# Patient Record
Sex: FEMALE | Race: WHITE | Employment: OTHER | URBAN - METROPOLITAN AREA
[De-identification: names, ages, dates, MRNs, and addresses within clinical notes are randomized per-mention and may not be internally consistent; named-entity substitution may affect disease eponyms.]

---

## 2017-07-21 ENCOUNTER — HOSPITAL ENCOUNTER (OUTPATIENT)
Dept: LAB | Age: 67
Discharge: HOME | End: 2017-07-21
Payer: MEDICARE

## 2017-07-21 ENCOUNTER — OFFICE VISIT (OUTPATIENT)
Dept: INTERNAL MEDICINE CLINIC | Age: 67
End: 2017-07-21

## 2017-07-21 VITALS
RESPIRATION RATE: 15 BRPM | HEIGHT: 66 IN | OXYGEN SATURATION: 97 % | TEMPERATURE: 98.5 F | BODY MASS INDEX: 38.25 KG/M2 | SYSTOLIC BLOOD PRESSURE: 130 MMHG | DIASTOLIC BLOOD PRESSURE: 76 MMHG | WEIGHT: 238 LBS | HEART RATE: 93 BPM

## 2017-07-21 DIAGNOSIS — H04.123 DRY EYES, BILATERAL: ICD-10-CM

## 2017-07-21 DIAGNOSIS — F39 MOOD DISORDER (HCC): ICD-10-CM

## 2017-07-21 DIAGNOSIS — M79.10 MYALGIA: ICD-10-CM

## 2017-07-21 DIAGNOSIS — E78.2 MIXED HYPERLIPIDEMIA: ICD-10-CM

## 2017-07-21 DIAGNOSIS — K21.9 GASTROESOPHAGEAL REFLUX DISEASE WITHOUT ESOPHAGITIS: ICD-10-CM

## 2017-07-21 DIAGNOSIS — M19.90 ARTHRITIS: ICD-10-CM

## 2017-07-21 DIAGNOSIS — I10 ESSENTIAL HYPERTENSION: Primary | ICD-10-CM

## 2017-07-21 DIAGNOSIS — M43.26 FUSION OF LUMBAR SPINE: ICD-10-CM

## 2017-07-21 DIAGNOSIS — F41.8 DEPRESSION WITH ANXIETY: ICD-10-CM

## 2017-07-21 PROBLEM — Z98.1 S/P LUMBAR SPINAL FUSION: Status: ACTIVE | Noted: 2017-07-21

## 2017-07-21 PROCEDURE — 80164 ASSAY DIPROPYLACETIC ACD TOT: CPT

## 2017-07-21 PROCEDURE — 86803 HEPATITIS C AB TEST: CPT

## 2017-07-21 PROCEDURE — 82607 VITAMIN B-12: CPT

## 2017-07-21 PROCEDURE — 82550 ASSAY OF CK (CPK): CPT

## 2017-07-21 PROCEDURE — 85027 COMPLETE CBC AUTOMATED: CPT

## 2017-07-21 PROCEDURE — 84443 ASSAY THYROID STIM HORMONE: CPT

## 2017-07-21 PROCEDURE — 80053 COMPREHEN METABOLIC PANEL: CPT

## 2017-07-21 PROCEDURE — 36415 COLL VENOUS BLD VENIPUNCTURE: CPT

## 2017-07-21 RX ORDER — DIVALPROEX SODIUM 500 MG/1
500 TABLET, DELAYED RELEASE ORAL 3 TIMES DAILY
Qty: 60 TAB | Refills: 0 | Status: ON HOLD | COMMUNITY
Start: 2017-07-21 | End: 2019-10-30

## 2017-07-21 RX ORDER — SIMVASTATIN 20 MG/1
20 TABLET, FILM COATED ORAL
Qty: 30 TAB | Refills: 0 | Status: ON HOLD | COMMUNITY
Start: 2017-07-21 | End: 2019-10-30

## 2017-07-21 RX ORDER — BUSPIRONE HYDROCHLORIDE 15 MG/1
15 TABLET ORAL 2 TIMES DAILY
Qty: 60 TAB | Refills: 0 | Status: ON HOLD | COMMUNITY
Start: 2017-07-21 | End: 2019-10-30

## 2017-07-21 RX ORDER — METOPROLOL TARTRATE 25 MG/1
25 TABLET, FILM COATED ORAL 2 TIMES DAILY
Qty: 60 TAB | Refills: 0 | COMMUNITY
Start: 2017-07-21

## 2017-07-21 RX ORDER — CHLORTHALIDONE 25 MG/1
25 TABLET ORAL DAILY
Qty: 30 TAB | Refills: 0 | Status: ON HOLD | COMMUNITY
Start: 2017-07-21 | End: 2019-10-30

## 2017-07-21 RX ORDER — PAROXETINE HYDROCHLORIDE 20 MG/1
20 TABLET, FILM COATED ORAL DAILY
Qty: 30 TAB | Refills: 0 | Status: ON HOLD | COMMUNITY
Start: 2017-07-21 | End: 2019-10-30

## 2017-07-21 RX ORDER — BUPROPION HYDROCHLORIDE 75 MG/1
75 TABLET ORAL 2 TIMES DAILY
Qty: 60 TAB | Refills: 0 | Status: ON HOLD | COMMUNITY
Start: 2017-07-21 | End: 2019-10-30

## 2017-07-21 RX ORDER — RANITIDINE 150 MG/1
150 TABLET, FILM COATED ORAL 2 TIMES DAILY
Qty: 60 TAB | Refills: 0 | COMMUNITY
Start: 2017-07-21 | End: 2018-04-27

## 2017-07-21 NOTE — PROGRESS NOTES
Chief Complaint   Patient presents with    New Patient       1. Have you been to the ER, urgent care clinic since your last visit? Hospitalized since your last visit? No    2. Have you seen or consulted any other health care providers outside of the [address] [place] [address] since your last visit? Include any pap smears or colon screening. No    Body mass index is 38.41 kg/(m^2).

## 2017-07-21 NOTE — MR AVS SNAPSHOT
Visit Information Date & Time Provider Department Dept. Phone Encounter #  
 7/21/2017  2:00 PM Mayda Kaplan MD [facility] Internal Medicine [phone] [id number] Upcoming Health Maintenance Date Due DTaP/Tdap/Td series (1 - Tdap) 1/18/1969 BREAST CANCER SCRN MAMMOGRAM 1/18/1998 FOBT Q 1 YEAR AGE 50-75 1/18/1998 ZOSTER VACCINE AGE 60> 11/18/2007 GLAUCOMA SCREENING Q2Y 1/18/2013 OSTEOPOROSIS SCREENING (DEXA) 1/18/2013 Pneumococcal 65+ Low/Medium Risk (1 of 2 - PCV13) 1/18/2013 MEDICARE YEARLY EXAM 1/18/2013 INFLUENZA AGE 9 TO ADULT 8/1/2017 Allergies as of 7/21/2017  Review Complete On: 7/21/2017 By: Mayda Kaplan MD  
  
 Severity Noted Reaction Type Reactions Penicillins  07/21/2017    Rash Current Immunizations  Never Reviewed No immunizations on file. Not reviewed this visit You Were Diagnosed With   
  
 Codes Comments Essential hypertension    -  Primary ICD-10-CM: I10 
ICD-9-CM: 401.9 Mixed hyperlipidemia     ICD-10-CM: E78.2 ICD-9-CM: 272.2 Arthritis     ICD-10-CM: M19.90 ICD-9-CM: 716.90 Depression with anxiety     ICD-10-CM: F41.8 ICD-9-CM: 300.4 Fusion of lumbar spine     ICD-10-CM: M43.26 
ICD-9-CM: 724.9 Dry eyes, bilateral     ICD-10-CM: N63.289 ICD-9-CM: 375.15 Gastroesophageal reflux disease without esophagitis     ICD-10-CM: K21.9 ICD-9-CM: 530.81 Mood disorder ([facility] [address]     ICD-10-CM: F39 
ICD-9-CM: 296.90 Myalgia     ICD-10-CM: M79.1 ICD-9-CM: 729.1 Vitals BP Pulse Temp Resp Height(growth percentile) Weight(growth percentile) 130/76 (BP 1 Location: Left arm, BP Patient Position: Sitting) 93 98.5 °F (36.9 °C) (Oral) 15 5' 6\" (1.676 m) 238 lb (108 kg) SpO2 BMI OB Status Smoking Status 97% 38.41 kg/m2 Postmenopausal Never Smoker Vitals History BMI and BSA Data Body Mass Index Body Surface Area  
 38.41 kg/m 2 2.24 m 2 Preferred Pharmacy Pharmacy Name Phone [facility], [address] [place] [address] [phone] Your Updated Medication List  
  
   
This list is accurate as of: 7/21/17  3:28 PM.  Always use your most recent med list.  
  
  
  
  
 buPROPion 75 mg tablet Commonly known as:  [facility] Take 1 Tab by mouth two (2) times a day. busPIRone 15 mg tablet Commonly known as:  BUSPAR Take 1 Tab by mouth two (2) times a day. chlorthalidone 25 mg tablet Commonly known as:  Debby Mooring Take 1 Tab by mouth daily. divalproex  mg tablet Commonly known as:  DEPAKOTE Take 1 Tab by mouth two (2) times a day. metoprolol tartrate 25 mg tablet Commonly known as:  LOPRESSOR Take 1 Tab by mouth two (2) times a day. PARoxetine 20 mg tablet Commonly known as:  PAXIL Take 1 Tab by mouth daily. raNITIdine 150 mg tablet Commonly known as:  ZANTAC Take 1 Tab by mouth two (2) times a day. simvastatin 20 mg tablet Commonly known as:  ZOCOR Take 1 Tab by mouth nightly. We Performed the Following CBC W/O DIFF [82021 CPT(R)] CK [id number] CPT(R)] HEPATITIS C AB [28185 CPT(R)] METABOLIC PANEL, COMPREHENSIVE [59790 CPT(R)] TSH 3RD GENERATION [52673 CPT(R)] VALPROIC ACID [70671 CPT(R)] VITAMIN B12 [id number] CPT(R)] Introducing [facility] SERVICES! [facility] introduces [employer] patient portal. Now you can access parts of your medical record, email your doctor's office, and request medication refills online. 1. In your internet browser, go to [email or web address] 2. Click on the First Time User? Click Here link in the Sign In box. You will see the New Member Sign Up page. 3. Enter your [employer] Access Code exactly as it appears below. You will not need to use this code after youve completed the sign-up process.  If you do not sign up before the expiration date, you must request a new code. · [employer] Access Code: [email or web address] Expires: 10/19/2017  3:27 PM 
 
4. Enter the last four digits of your Social Security Number (xxxx) and Date of Birth (mm/dd/yyyy) as indicated and click Submit. You will be taken to the next sign-up page. 5. Create a [employer]APR ID. This will be your [employer] login ID and cannot be changed, so think of one that is secure and easy to remember. 6. Create a [employer] password. You can change your password at any time. 7. Enter your Password Reset Question and Answer. This can be used at a later time if you forget your password. 8. Enter your e-mail address. You will receive e-mail notification when new information is available in [address] 9. Click Sign Up. You can now view and download portions of your medical record. 10. Click the Download Summary menu link to download a portable copy of your medical information. If you have questions, please visit the Frequently Asked Questions section of the [employer] website. Remember, [employer] is NOT to be used for urgent needs. For medical emergencies, dial 911. Now available from your iPhone and Android! Please provide this summary of care documentation to your next provider. Your primary care clinician is listed as Leonora Gilbert. If you have any questions after today's visit, please call [phone].

## 2017-07-21 NOTE — PROGRESS NOTES
Written by Boston Yoder, as dictated by Dr. Ilana Cortes MD.    Johnny Oshea is a 69 y.o. female. HPI  The patient comes in today to establish care. She recently moved from [place], [place]. She has hx of CKD stage III, diagnosed 4 years ago. It has remained stable since then. She has taken ibuprofen and Prilosec before, but no longer takes Prilosec. She does take Zantac for acid reflux. She does still take ibuprofen for lower back pain as Tylenol does not help. She has past surgical hx of lumbar fusion, which did not help completely with the back pain but has improved it. She was advised by PT to lay on her stomach to help flatten her back, and she finds that it helps. She does not have sciatica. She also had a R hip replacement, which has helped significantly with her hip pain. She has recently been experiencing arm muscle pain and stiffness in the morning. She has a hx of hypertension which has been controlled with medication (metoprolol and chlorthalidone) for at least 10 years. She denies hx of heart issues. She has also been experiencing on-and-off superficial pain in her RLQ, which has not been hurting for the last couple weeks. She follows with ophthalmology for chronic dry eyes. She does take eyedrops for this and tries to keep up with blinking exercises. She is also compliant on medication for high cholesterol. She has a hx of depression/anxiety after a difficult pregnancy. She is compliant on Wellbutrin, Paxil, Depakote, and Buspar. She did admit  not drinking  enough water. She has a hx of vulvar dystrophy and used to take medication and follow with an ob/gyn for it, but does not anymore. She would like to lose weight.     Patient Active Problem List   Diagnosis Code    Essential hypertension I10    Mixed hyperlipidemia E78.2    Arthritis M19.90    Fusion of lumbar spine M43.26    Dry eyes, bilateral H04.123    Gastroesophageal reflux disease without esophagitis K21.9        No current outpatient prescriptions on file prior to visit. No current facility-administered medications on file prior to visit. Allergies   Allergen Reactions    Penicillins Rash       Past Medical History:   Diagnosis Date    Chronic kidney disease     Depression     Hypercholesterolemia     Hypertension        Past Surgical History:   Procedure Laterality Date    HX APPENDECTOMY      HX CHOLECYSTECTOMY      HX LUMBAR FUSION      HX ORTHOPAEDIC      HX TONSIL AND ADENOIDECTOMY         Family History   Problem Relation Age of Onset    No Known Problems Mother     Heart Attack Father     Heart Disease Maternal Aunt     Heart Attack Paternal Uncle     Cancer Maternal Aunt     Heart Disease Paternal Aunt        Social History     Social History    Marital status: UNKNOWN     Spouse name: N/A    Number of children: N/A    Years of education: N/A     Occupational History    Not on file. Social History Main Topics    Smoking status: Never Smoker    Smokeless tobacco: Never Used    Alcohol use No      Comment: rarely    Drug use: No    Sexual activity: Not Currently     Other Topics Concern    Not on file     Social History Narrative    No narrative on file         Review of Systems   Constitutional: Negative for malaise/fatigue. HENT: Negative for congestion. Eyes: Negative for blurred vision and pain. Respiratory: Negative for cough and shortness of breath. Cardiovascular: Negative for chest pain and palpitations. Gastrointestinal: Positive for heartburn. Negative for abdominal pain. Musculoskeletal: Positive for back pain. Negative for joint pain and myalgias. Neurological: Negative for weakness and headaches. Psychiatric/Behavioral: Positive for depression. Negative for memory loss and substance abuse. The patient is nervous/anxious.       Visit Vitals    /76 (BP 1 Location: Left arm, BP Patient Position: Sitting)    Pulse 93    Temp 98.5 °F (36.9 °C) (Oral)    Resp 15    Ht 5' 6\" (1.676 m)    Wt 238 lb (108 kg)    SpO2 97%    BMI 38.41 kg/m2       Physical Exam   Constitutional: She is oriented to person, place, and time. She appears well-developed. No distress. Obese   HENT:   Right Ear: External ear normal.   Left Ear: External ear normal.   Eyes: Conjunctivae and EOM are normal. Right eye exhibits no discharge. Left eye exhibits no discharge. Neck: Normal range of motion. Neck supple. Cardiovascular: Normal rate and regular rhythm. Pulses:       Dorsalis pedis pulses are 2+ on the right side, and 2+ on the left side. Pulmonary/Chest: Effort normal and breath sounds normal. She has no wheezes. Abdominal: Soft. Bowel sounds are normal. There is no tenderness. Lymphadenopathy:     She has no cervical adenopathy. Neurological: She is alert and oriented to person, place, and time. Skin: She is not diaphoretic. Psychiatric: She has a normal mood and affect. Her behavior is normal.   Nursing note and vitals reviewed. ASSESSMENT and PLAN    ICD-10-CM ICD-9-CM    1. Essential hypertension I10 401.9 metoprolol tartrate (LOPRESSOR) 25 mg tablet sent to pharmacy      chlorthalidone (HYGROTEN) 25 mg tablet sent to pharmacy      CBC W/O DIFF      METABOLIC PANEL, COMPREHENSIVE    Metoprolol and chlorthalidone refilled. Will check labs today. She should be drinking at least 6 glasses of water per day     2. Mixed hyperlipidemia E78.2 272.2 simvastatin (ZOCOR) 20 mg tablet sent to pharmacy    Zocor refilled. 3. Arthritis M19.90 716.90 Pt takes ibuprofen prn for pain. 4. Depression with anxiety F41.8 300.4 PARoxetine (PAXIL) 20 mg tablet sent to pharmacy      buPROPion (WELLBUTRIN) 75 mg tablet sent to pharmacy      busPIRone (BUSPAR) 15 mg tablet sent to pharmacy      VALPROIC ACID    Paxil, Wellbutrin, and Buspar refilled. 5. Fusion of lumbar spine M43.26 724.9 Pt takes ibuprofen for back pain   6.  Dry eyes, bilateral H04. 123 375.15 Pt follows with ophthalmology. 7. Gastroesophageal reflux disease without esophagitis K21.9 530.81 raNITIdine (ZANTAC) 150 mg tablet sent to pharmacy    Zantac refilled. 8. [facility] F39 296.90 divalproex DR (DEPAKOTE) 500 mg tablet sent to pharmacy    Depakote refilled. 9. Myalgia M79.1 729.1 Check CK. TSH 3RD GENERATION      VITAMIN B12      HEPATITIS C AB    Will recheck her levels today. This plan was reviewed with the patient and patient agrees. All questions were answered. This scribe documentation was reviewed by me and accurately reflects the examination and decisions made by me. This note will not be viewable in [address]

## 2017-07-22 LAB
ALBUMIN SERPL-MCNC: 4.4 G/DL (ref 3.6–4.8)
ALBUMIN/GLOB SERPL: 1.7 {RATIO} (ref 1.2–2.2)
ALP SERPL-CCNC: 116 IU/L (ref 39–117)
ALT SERPL-CCNC: 17 IU/L (ref 0–32)
AST SERPL-CCNC: 19 IU/L (ref 0–40)
BILIRUB SERPL-MCNC: 0.7 MG/DL (ref 0–1.2)
BUN SERPL-MCNC: 17 MG/DL (ref 8–27)
BUN/CREAT SERPL: 11 (ref 12–28)
CALCIUM SERPL-MCNC: 9.4 MG/DL (ref 8.7–10.3)
CHLORIDE SERPL-SCNC: 98 MMOL/L (ref 96–106)
CK SERPL-CCNC: 140 U/L (ref 24–173)
CO2 SERPL-SCNC: 24 MMOL/L (ref 18–29)
CREAT SERPL-MCNC: 1.48 MG/DL (ref 0.57–1)
ERYTHROCYTE [DISTWIDTH] IN BLOOD BY AUTOMATED COUNT: 15.1 % (ref 12.3–15.4)
GLOBULIN SER CALC-MCNC: 2.6 G/DL (ref 1.5–4.5)
GLUCOSE SERPL-MCNC: 92 MG/DL (ref 65–99)
HCT VFR BLD AUTO: 44.1 % (ref 34–46.6)
HCV AB S/CO SERPL IA: <0.1 S/CO RATIO (ref 0–0.9)
HGB BLD-MCNC: 14.6 G/DL (ref 11.1–15.9)
INTERPRETATION: NORMAL
MCH RBC QN AUTO: 29.1 PG (ref 26.6–33)
MCHC RBC AUTO-ENTMCNC: 33.1 G/DL (ref 31.5–35.7)
MCV RBC AUTO: 88 FL (ref 79–97)
PLATELET # BLD AUTO: 236 X10E3/UL (ref 150–379)
POTASSIUM SERPL-SCNC: 4.3 MMOL/L (ref 3.5–5.2)
PROT SERPL-MCNC: 7 G/DL (ref 6–8.5)
RBC # BLD AUTO: 5.01 X10E6/UL (ref 3.77–5.28)
SODIUM SERPL-SCNC: 141 MMOL/L (ref 134–144)
TSH SERPL DL<=0.005 MIU/L-ACNC: 2.09 UIU/ML (ref 0.45–4.5)
VALPROATE SERPL-MCNC: 39 UG/ML (ref 50–100)
VIT B12 SERPL-MCNC: 559 PG/ML (ref 211–946)
WBC # BLD AUTO: 6.2 X10E3/UL (ref 3.4–10.8)

## 2017-07-22 NOTE — PROGRESS NOTES
Let her know creatinine is elevated. Has atleast stage 2 disease. Will discuss rest of the labs during her visit.

## 2017-08-07 ENCOUNTER — OFFICE VISIT (OUTPATIENT)
Dept: INTERNAL MEDICINE CLINIC | Age: 67
End: 2017-08-07

## 2017-08-23 DIAGNOSIS — M19.90 ARTHRITIS: ICD-10-CM

## 2017-08-23 DIAGNOSIS — I10 ESSENTIAL HYPERTENSION: Primary | ICD-10-CM

## 2017-08-23 DIAGNOSIS — E78.2 MIXED HYPERLIPIDEMIA: ICD-10-CM

## 2017-12-01 ENCOUNTER — HOSPITAL ENCOUNTER (OUTPATIENT)
Dept: CT IMAGING | Age: 67
Discharge: HOME | End: 2017-12-01
Attending: PHYSICIAN ASSISTANT
Payer: MEDICARE

## 2017-12-01 DIAGNOSIS — Z98.1 S/P LUMBAR FUSION: ICD-10-CM

## 2017-12-01 DIAGNOSIS — M48.061 LUMBAR STENOSIS: ICD-10-CM

## 2017-12-01 DIAGNOSIS — M54.50 LOW BACK PAIN: ICD-10-CM

## 2017-12-01 DIAGNOSIS — R29.2 ABNORMAL REFLEXES: ICD-10-CM

## 2017-12-01 PROCEDURE — 72131 CT LUMBAR SPINE W/O DYE: CPT

## 2017-12-05 ENCOUNTER — HOSPITAL ENCOUNTER (OUTPATIENT)
Dept: MRI IMAGING | Age: 67
Discharge: HOME | End: 2017-12-05
Attending: PHYSICIAN ASSISTANT
Payer: MEDICARE

## 2017-12-05 DIAGNOSIS — R29.2 ABNORMAL REFLEXES: ICD-10-CM

## 2017-12-05 DIAGNOSIS — M54.50 LOW BACK PAIN: ICD-10-CM

## 2017-12-05 DIAGNOSIS — M48.061 LUMBAR STENOSIS: ICD-10-CM

## 2017-12-05 DIAGNOSIS — Z98.1 S/P LUMBAR FUSION: ICD-10-CM

## 2017-12-05 PROCEDURE — 72148 MRI LUMBAR SPINE W/O DYE: CPT

## 2017-12-05 PROCEDURE — 72141 MRI NECK SPINE W/O DYE: CPT

## 2017-12-05 PROCEDURE — 72146 MRI CHEST SPINE W/O DYE: CPT

## 2018-01-16 ENCOUNTER — APPOINTMENT (OUTPATIENT)
Dept: GENERAL RADIOLOGY | Age: 68
End: 2018-01-16
Attending: ORTHOPAEDIC SURGERY
Payer: MEDICARE

## 2018-01-16 ENCOUNTER — HOSPITAL ENCOUNTER (OUTPATIENT)
Age: 68
Setting detail: OBSERVATION
Discharge: HOME | End: 2018-01-17
Attending: ORTHOPAEDIC SURGERY | Admitting: ORTHOPAEDIC SURGERY
Payer: MEDICARE

## 2018-01-16 ENCOUNTER — ANESTHESIA EVENT (OUTPATIENT)
Dept: SURGERY | Age: 68
End: 2018-01-16
Payer: MEDICARE

## 2018-01-16 ENCOUNTER — ANESTHESIA (OUTPATIENT)
Dept: SURGERY | Age: 68
End: 2018-01-16
Payer: MEDICARE

## 2018-01-16 DIAGNOSIS — M48.02 CERVICAL STENOSIS OF SPINAL CANAL: Primary | ICD-10-CM

## 2018-01-16 PROCEDURE — 76010000172 HC OR TIME 2.5 TO 3 HR INTENSV-TIER 1: Performed by: ORTHOPAEDIC SURGERY

## 2018-01-16 PROCEDURE — 74011250637 HC RX REV CODE- 250/637: Performed by: PHYSICIAN ASSISTANT

## 2018-01-16 PROCEDURE — 77030018846 HC SOL IRR STRL H20 ICUM -A: Performed by: ORTHOPAEDIC SURGERY

## 2018-01-16 PROCEDURE — 77030032490 HC SLV COMPR SCD KNE COVD -B: Performed by: ORTHOPAEDIC SURGERY

## 2018-01-16 PROCEDURE — 99218 HC RM OBSERVATION: CPT

## 2018-01-16 PROCEDURE — 77030012464: Performed by: ORTHOPAEDIC SURGERY

## 2018-01-16 PROCEDURE — 77030029099 HC BN WAX SSPC -A: Performed by: ORTHOPAEDIC SURGERY

## 2018-01-16 PROCEDURE — 74011250636 HC RX REV CODE- 250/636

## 2018-01-16 PROCEDURE — C1713 ANCHOR/SCREW BN/BN,TIS/BN: HCPCS | Performed by: ORTHOPAEDIC SURGERY

## 2018-01-16 PROCEDURE — 77030026438 HC STYL ET INTUB CARD -A: Performed by: ANESTHESIOLOGY

## 2018-01-16 PROCEDURE — 77030012406 HC DRN WND PENRS BARD -A: Performed by: ORTHOPAEDIC SURGERY

## 2018-01-16 PROCEDURE — 74011000250 HC RX REV CODE- 250

## 2018-01-16 PROCEDURE — 74011250636 HC RX REV CODE- 250/636: Performed by: PHYSICIAN ASSISTANT

## 2018-01-16 PROCEDURE — 77030031139 HC SUT VCRL2 J&J -A: Performed by: ORTHOPAEDIC SURGERY

## 2018-01-16 PROCEDURE — 76210000000 HC OR PH I REC 2 TO 2.5 HR: Performed by: ORTHOPAEDIC SURGERY

## 2018-01-16 PROCEDURE — 77030013079 HC BLNKT BAIR HGGR 3M -A: Performed by: ANESTHESIOLOGY

## 2018-01-16 PROCEDURE — 77030014007 HC SPNG HEMSTAT J&J -B: Performed by: ORTHOPAEDIC SURGERY

## 2018-01-16 PROCEDURE — 74011250636 HC RX REV CODE- 250/636: Performed by: ANESTHESIOLOGY

## 2018-01-16 PROCEDURE — 77030004391 HC BUR FLUT MEDT -C: Performed by: ORTHOPAEDIC SURGERY

## 2018-01-16 PROCEDURE — 77030011267 HC ELECTRD BLD COVD -A: Performed by: ORTHOPAEDIC SURGERY

## 2018-01-16 PROCEDURE — 77030022965 HC BIT DRL W/STP GLBM -B: Performed by: ORTHOPAEDIC SURGERY

## 2018-01-16 PROCEDURE — 77030013567 HC DRN WND RESERV BARD -A: Performed by: ORTHOPAEDIC SURGERY

## 2018-01-16 PROCEDURE — 74011000272 HC RX REV CODE- 272: Performed by: ORTHOPAEDIC SURGERY

## 2018-01-16 PROCEDURE — 76060000036 HC ANESTHESIA 2.5 TO 3 HR: Performed by: ORTHOPAEDIC SURGERY

## 2018-01-16 PROCEDURE — 74011000250 HC RX REV CODE- 250: Performed by: ORTHOPAEDIC SURGERY

## 2018-01-16 PROCEDURE — 77030003666 HC NDL SPINAL BD -A: Performed by: ORTHOPAEDIC SURGERY

## 2018-01-16 PROCEDURE — 72020 X-RAY EXAM OF SPINE 1 VIEW: CPT

## 2018-01-16 PROCEDURE — 77030018836 HC SOL IRR NACL ICUM -A: Performed by: ORTHOPAEDIC SURGERY

## 2018-01-16 PROCEDURE — 74011250636 HC RX REV CODE- 250/636: Performed by: ORTHOPAEDIC SURGERY

## 2018-01-16 PROCEDURE — 77030038600 HC TU BPLR IRR DISP STRY -B: Performed by: ORTHOPAEDIC SURGERY

## 2018-01-16 PROCEDURE — 77030008684 HC TU ET CUF COVD -B: Performed by: ANESTHESIOLOGY

## 2018-01-16 DEVICE — BONE SPCR CERV LORDC 7X9MM --: Type: IMPLANTABLE DEVICE | Site: SPINE CERVICAL | Status: FUNCTIONAL

## 2018-01-16 DEVICE — ASSURE CERVICAL PLATE 2-LEVEL, 36MM
Type: IMPLANTABLE DEVICE | Site: SPINE CERVICAL | Status: FUNCTIONAL
Brand: ASSURE

## 2018-01-16 DEVICE — BONE SPCR CERV LORDC 5X7MM --: Type: IMPLANTABLE DEVICE | Site: SPINE CERVICAL | Status: FUNCTIONAL

## 2018-01-16 DEVICE — 4.0MM RIGID SCREW, SELF-TAPPING, 12MM
Type: IMPLANTABLE DEVICE | Site: SPINE CERVICAL | Status: FUNCTIONAL
Brand: ASSURE

## 2018-01-16 DEVICE — SET SCREW FOR RIGID SCREWS
Type: IMPLANTABLE DEVICE | Site: SPINE CERVICAL | Status: FUNCTIONAL
Brand: ASSURE

## 2018-01-16 RX ORDER — SODIUM CHLORIDE 9 MG/ML
1000 INJECTION, SOLUTION INTRAVENOUS CONTINUOUS
Status: DISCONTINUED | OUTPATIENT
Start: 2018-01-16 | End: 2018-01-16 | Stop reason: HOSPADM

## 2018-01-16 RX ORDER — FAMOTIDINE 20 MG/1
20 TABLET, FILM COATED ORAL
Status: DISCONTINUED | OUTPATIENT
Start: 2018-01-16 | End: 2018-01-17 | Stop reason: HOSPADM

## 2018-01-16 RX ORDER — BUPROPION HYDROCHLORIDE 75 MG/1
75 TABLET ORAL 2 TIMES DAILY
Status: DISCONTINUED | OUTPATIENT
Start: 2018-01-16 | End: 2018-01-17 | Stop reason: HOSPADM

## 2018-01-16 RX ORDER — SODIUM CHLORIDE 0.9 % (FLUSH) 0.9 %
5-10 SYRINGE (ML) INJECTION EVERY 8 HOURS
Status: DISCONTINUED | OUTPATIENT
Start: 2018-01-16 | End: 2018-01-17 | Stop reason: HOSPADM

## 2018-01-16 RX ORDER — DIAZEPAM 5 MG/1
5 TABLET ORAL
Status: DISCONTINUED | OUTPATIENT
Start: 2018-01-16 | End: 2018-01-17 | Stop reason: HOSPADM

## 2018-01-16 RX ORDER — ACETAMINOPHEN 325 MG/1
650 TABLET ORAL
Status: DISCONTINUED | OUTPATIENT
Start: 2018-01-16 | End: 2018-01-17 | Stop reason: HOSPADM

## 2018-01-16 RX ORDER — NALOXONE HYDROCHLORIDE 0.4 MG/ML
0.04 INJECTION, SOLUTION INTRAMUSCULAR; INTRAVENOUS; SUBCUTANEOUS AS NEEDED
Status: DISCONTINUED | OUTPATIENT
Start: 2018-01-16 | End: 2018-01-16 | Stop reason: HOSPADM

## 2018-01-16 RX ORDER — SODIUM CHLORIDE 0.9 % (FLUSH) 0.9 %
5-10 SYRINGE (ML) INJECTION AS NEEDED
Status: DISCONTINUED | OUTPATIENT
Start: 2018-01-16 | End: 2018-01-16 | Stop reason: HOSPADM

## 2018-01-16 RX ORDER — SODIUM CHLORIDE 0.9 % (FLUSH) 0.9 %
5-10 SYRINGE (ML) INJECTION AS NEEDED
Status: DISCONTINUED | OUTPATIENT
Start: 2018-01-16 | End: 2018-01-17 | Stop reason: HOSPADM

## 2018-01-16 RX ORDER — LIDOCAINE HYDROCHLORIDE 10 MG/ML
0.1 INJECTION, SOLUTION EPIDURAL; INFILTRATION; INTRACAUDAL; PERINEURAL AS NEEDED
Status: DISCONTINUED | OUTPATIENT
Start: 2018-01-16 | End: 2018-01-16 | Stop reason: HOSPADM

## 2018-01-16 RX ORDER — FENTANYL CITRATE 50 UG/ML
25 INJECTION, SOLUTION INTRAMUSCULAR; INTRAVENOUS
Status: DISCONTINUED | OUTPATIENT
Start: 2018-01-16 | End: 2018-01-16 | Stop reason: HOSPADM

## 2018-01-16 RX ORDER — ONDANSETRON 2 MG/ML
4 INJECTION INTRAMUSCULAR; INTRAVENOUS
Status: DISCONTINUED | OUTPATIENT
Start: 2018-01-16 | End: 2018-01-17 | Stop reason: HOSPADM

## 2018-01-16 RX ORDER — SODIUM CHLORIDE 9 MG/ML
125 INJECTION, SOLUTION INTRAVENOUS CONTINUOUS
Status: DISCONTINUED | OUTPATIENT
Start: 2018-01-16 | End: 2018-01-17 | Stop reason: HOSPADM

## 2018-01-16 RX ORDER — DIPHENHYDRAMINE HYDROCHLORIDE 50 MG/ML
12.5 INJECTION, SOLUTION INTRAMUSCULAR; INTRAVENOUS AS NEEDED
Status: DISCONTINUED | OUTPATIENT
Start: 2018-01-16 | End: 2018-01-16 | Stop reason: HOSPADM

## 2018-01-16 RX ORDER — FENTANYL CITRATE 50 UG/ML
INJECTION, SOLUTION INTRAMUSCULAR; INTRAVENOUS AS NEEDED
Status: DISCONTINUED | OUTPATIENT
Start: 2018-01-16 | End: 2018-01-16 | Stop reason: HOSPADM

## 2018-01-16 RX ORDER — MIDAZOLAM HYDROCHLORIDE 1 MG/ML
1 INJECTION, SOLUTION INTRAMUSCULAR; INTRAVENOUS AS NEEDED
Status: DISCONTINUED | OUTPATIENT
Start: 2018-01-16 | End: 2018-01-16 | Stop reason: HOSPADM

## 2018-01-16 RX ORDER — DIVALPROEX SODIUM 500 MG/1
500 TABLET, DELAYED RELEASE ORAL 2 TIMES DAILY
Status: DISCONTINUED | OUTPATIENT
Start: 2018-01-16 | End: 2018-01-17 | Stop reason: HOSPADM

## 2018-01-16 RX ORDER — NALOXONE HYDROCHLORIDE 0.4 MG/ML
0.4 INJECTION, SOLUTION INTRAMUSCULAR; INTRAVENOUS; SUBCUTANEOUS AS NEEDED
Status: DISCONTINUED | OUTPATIENT
Start: 2018-01-16 | End: 2018-01-17 | Stop reason: HOSPADM

## 2018-01-16 RX ORDER — SIMVASTATIN 20 MG/1
20 TABLET, FILM COATED ORAL
Status: DISCONTINUED | OUTPATIENT
Start: 2018-01-16 | End: 2018-01-17 | Stop reason: HOSPADM

## 2018-01-16 RX ORDER — HYDROMORPHONE HYDROCHLORIDE 1 MG/ML
0.5 INJECTION, SOLUTION INTRAMUSCULAR; INTRAVENOUS; SUBCUTANEOUS
Status: DISCONTINUED | OUTPATIENT
Start: 2018-01-16 | End: 2018-01-17 | Stop reason: HOSPADM

## 2018-01-16 RX ORDER — ROCURONIUM BROMIDE 10 MG/ML
INJECTION, SOLUTION INTRAVENOUS AS NEEDED
Status: DISCONTINUED | OUTPATIENT
Start: 2018-01-16 | End: 2018-01-16 | Stop reason: HOSPADM

## 2018-01-16 RX ORDER — PAROXETINE HYDROCHLORIDE 20 MG/1
20 TABLET, FILM COATED ORAL DAILY
Status: DISCONTINUED | OUTPATIENT
Start: 2018-01-17 | End: 2018-01-17 | Stop reason: HOSPADM

## 2018-01-16 RX ORDER — ONDANSETRON 2 MG/ML
INJECTION INTRAMUSCULAR; INTRAVENOUS AS NEEDED
Status: DISCONTINUED | OUTPATIENT
Start: 2018-01-16 | End: 2018-01-16 | Stop reason: HOSPADM

## 2018-01-16 RX ORDER — SODIUM CHLORIDE, SODIUM LACTATE, POTASSIUM CHLORIDE, CALCIUM CHLORIDE 600; 310; 30; 20 MG/100ML; MG/100ML; MG/100ML; MG/100ML
125 INJECTION, SOLUTION INTRAVENOUS CONTINUOUS
Status: DISCONTINUED | OUTPATIENT
Start: 2018-01-16 | End: 2018-01-16 | Stop reason: HOSPADM

## 2018-01-16 RX ORDER — METOPROLOL TARTRATE 25 MG/1
25 TABLET, FILM COATED ORAL 2 TIMES DAILY
Status: DISCONTINUED | OUTPATIENT
Start: 2018-01-16 | End: 2018-01-17 | Stop reason: HOSPADM

## 2018-01-16 RX ORDER — MORPHINE SULFATE 10 MG/ML
2 INJECTION, SOLUTION INTRAMUSCULAR; INTRAVENOUS
Status: DISCONTINUED | OUTPATIENT
Start: 2018-01-16 | End: 2018-01-16 | Stop reason: HOSPADM

## 2018-01-16 RX ORDER — SODIUM CHLORIDE 9 MG/ML
50 INJECTION, SOLUTION INTRAVENOUS CONTINUOUS
Status: DISCONTINUED | OUTPATIENT
Start: 2018-01-16 | End: 2018-01-16 | Stop reason: HOSPADM

## 2018-01-16 RX ORDER — OXYCODONE AND ACETAMINOPHEN 5; 325 MG/1; MG/1
1 TABLET ORAL AS NEEDED
Status: DISCONTINUED | OUTPATIENT
Start: 2018-01-16 | End: 2018-01-16 | Stop reason: HOSPADM

## 2018-01-16 RX ORDER — OXYCODONE HYDROCHLORIDE 5 MG/1
10 TABLET ORAL
Status: DISCONTINUED | OUTPATIENT
Start: 2018-01-16 | End: 2018-01-17 | Stop reason: HOSPADM

## 2018-01-16 RX ORDER — HYDROMORPHONE HCL IN 0.9% NACL 15 MG/30ML
PATIENT CONTROLLED ANALGESIA VIAL INTRAVENOUS
Status: DISCONTINUED | OUTPATIENT
Start: 2018-01-16 | End: 2018-01-17

## 2018-01-16 RX ORDER — FENTANYL CITRATE 50 UG/ML
50 INJECTION, SOLUTION INTRAMUSCULAR; INTRAVENOUS AS NEEDED
Status: DISCONTINUED | OUTPATIENT
Start: 2018-01-16 | End: 2018-01-16 | Stop reason: HOSPADM

## 2018-01-16 RX ORDER — PHENYLEPHRINE HCL IN 0.9% NACL 0.4MG/10ML
SYRINGE (ML) INTRAVENOUS AS NEEDED
Status: DISCONTINUED | OUTPATIENT
Start: 2018-01-16 | End: 2018-01-16 | Stop reason: HOSPADM

## 2018-01-16 RX ORDER — LORAZEPAM 1 MG/1
1 TABLET ORAL
Status: ON HOLD | COMMUNITY
End: 2019-10-30

## 2018-01-16 RX ORDER — LIDOCAINE HYDROCHLORIDE 20 MG/ML
INJECTION, SOLUTION EPIDURAL; INFILTRATION; INTRACAUDAL; PERINEURAL AS NEEDED
Status: DISCONTINUED | OUTPATIENT
Start: 2018-01-16 | End: 2018-01-16 | Stop reason: HOSPADM

## 2018-01-16 RX ORDER — AMOXICILLIN 250 MG
1 CAPSULE ORAL 2 TIMES DAILY
Status: DISCONTINUED | OUTPATIENT
Start: 2018-01-16 | End: 2018-01-17 | Stop reason: HOSPADM

## 2018-01-16 RX ORDER — FACIAL-BODY WIPES
10 EACH TOPICAL DAILY PRN
Status: DISCONTINUED | OUTPATIENT
Start: 2018-01-18 | End: 2018-01-17 | Stop reason: HOSPADM

## 2018-01-16 RX ORDER — POLYETHYLENE GLYCOL 3350 17 G/17G
17 POWDER, FOR SOLUTION ORAL DAILY
Status: DISCONTINUED | OUTPATIENT
Start: 2018-01-17 | End: 2018-01-17 | Stop reason: HOSPADM

## 2018-01-16 RX ORDER — DIPHENHYDRAMINE HYDROCHLORIDE 50 MG/ML
12.5 INJECTION, SOLUTION INTRAMUSCULAR; INTRAVENOUS
Status: DISCONTINUED | OUTPATIENT
Start: 2018-01-16 | End: 2018-01-17 | Stop reason: HOSPADM

## 2018-01-16 RX ORDER — MIDAZOLAM HYDROCHLORIDE 1 MG/ML
INJECTION, SOLUTION INTRAMUSCULAR; INTRAVENOUS AS NEEDED
Status: DISCONTINUED | OUTPATIENT
Start: 2018-01-16 | End: 2018-01-16 | Stop reason: HOSPADM

## 2018-01-16 RX ORDER — SODIUM CHLORIDE 0.9 % (FLUSH) 0.9 %
5-10 SYRINGE (ML) INJECTION EVERY 8 HOURS
Status: DISCONTINUED | OUTPATIENT
Start: 2018-01-17 | End: 2018-01-17 | Stop reason: HOSPADM

## 2018-01-16 RX ORDER — RANITIDINE 150 MG/1
150 TABLET, FILM COATED ORAL 2 TIMES DAILY
Status: DISCONTINUED | OUTPATIENT
Start: 2018-01-16 | End: 2018-01-16 | Stop reason: CLARIF

## 2018-01-16 RX ORDER — LORAZEPAM 1 MG/1
1 TABLET ORAL
Status: DISCONTINUED | OUTPATIENT
Start: 2018-01-16 | End: 2018-01-17 | Stop reason: HOSPADM

## 2018-01-16 RX ORDER — ONDANSETRON 2 MG/ML
4 INJECTION INTRAMUSCULAR; INTRAVENOUS AS NEEDED
Status: DISCONTINUED | OUTPATIENT
Start: 2018-01-16 | End: 2018-01-16 | Stop reason: HOSPADM

## 2018-01-16 RX ORDER — SUCCINYLCHOLINE CHLORIDE 20 MG/ML
INJECTION INTRAMUSCULAR; INTRAVENOUS AS NEEDED
Status: DISCONTINUED | OUTPATIENT
Start: 2018-01-16 | End: 2018-01-16 | Stop reason: HOSPADM

## 2018-01-16 RX ORDER — PROPOFOL 10 MG/ML
INJECTION, EMULSION INTRAVENOUS
Status: DISCONTINUED | OUTPATIENT
Start: 2018-01-16 | End: 2018-01-16 | Stop reason: HOSPADM

## 2018-01-16 RX ORDER — METOPROLOL TARTRATE 25 MG/1
25 TABLET, FILM COATED ORAL 2 TIMES DAILY
Status: DISCONTINUED | OUTPATIENT
Start: 2018-01-16 | End: 2018-01-16 | Stop reason: SDUPTHER

## 2018-01-16 RX ORDER — CEFAZOLIN SODIUM/WATER 2 G/20 ML
2 SYRINGE (ML) INTRAVENOUS ONCE
Status: COMPLETED | OUTPATIENT
Start: 2018-01-16 | End: 2018-01-16

## 2018-01-16 RX ORDER — CEFAZOLIN SODIUM/WATER 2 G/20 ML
2 SYRINGE (ML) INTRAVENOUS EVERY 8 HOURS
Status: COMPLETED | OUTPATIENT
Start: 2018-01-16 | End: 2018-01-17

## 2018-01-16 RX ORDER — SODIUM CHLORIDE 0.9 % (FLUSH) 0.9 %
5-10 SYRINGE (ML) INJECTION EVERY 8 HOURS
Status: DISCONTINUED | OUTPATIENT
Start: 2018-01-16 | End: 2018-01-16 | Stop reason: HOSPADM

## 2018-01-16 RX ORDER — MIDAZOLAM HYDROCHLORIDE 1 MG/ML
0.5 INJECTION, SOLUTION INTRAMUSCULAR; INTRAVENOUS
Status: DISCONTINUED | OUTPATIENT
Start: 2018-01-16 | End: 2018-01-16 | Stop reason: HOSPADM

## 2018-01-16 RX ORDER — PROPOFOL 10 MG/ML
INJECTION, EMULSION INTRAVENOUS AS NEEDED
Status: DISCONTINUED | OUTPATIENT
Start: 2018-01-16 | End: 2018-01-16 | Stop reason: HOSPADM

## 2018-01-16 RX ORDER — OXYCODONE HYDROCHLORIDE 5 MG/1
5 TABLET ORAL
Status: DISCONTINUED | OUTPATIENT
Start: 2018-01-16 | End: 2018-01-17 | Stop reason: HOSPADM

## 2018-01-16 RX ORDER — HYDROMORPHONE HYDROCHLORIDE 2 MG/ML
INJECTION, SOLUTION INTRAMUSCULAR; INTRAVENOUS; SUBCUTANEOUS AS NEEDED
Status: DISCONTINUED | OUTPATIENT
Start: 2018-01-16 | End: 2018-01-16 | Stop reason: HOSPADM

## 2018-01-16 RX ORDER — DEXAMETHASONE SODIUM PHOSPHATE 4 MG/ML
INJECTION, SOLUTION INTRA-ARTICULAR; INTRALESIONAL; INTRAMUSCULAR; INTRAVENOUS; SOFT TISSUE AS NEEDED
Status: DISCONTINUED | OUTPATIENT
Start: 2018-01-16 | End: 2018-01-16 | Stop reason: HOSPADM

## 2018-01-16 RX ORDER — CHLORTHALIDONE 25 MG/1
25 TABLET ORAL DAILY
Status: DISCONTINUED | OUTPATIENT
Start: 2018-01-17 | End: 2018-01-17 | Stop reason: HOSPADM

## 2018-01-16 RX ADMIN — Medication: at 17:54

## 2018-01-16 RX ADMIN — SUCCINYLCHOLINE CHLORIDE 140 MG: 20 INJECTION INTRAMUSCULAR; INTRAVENOUS at 14:28

## 2018-01-16 RX ADMIN — Medication 2 G: at 14:35

## 2018-01-16 RX ADMIN — SIMVASTATIN 20 MG: 20 TABLET, FILM COATED ORAL at 22:54

## 2018-01-16 RX ADMIN — Medication 80 MCG: at 17:03

## 2018-01-16 RX ADMIN — SODIUM CHLORIDE 125 ML/HR: 900 INJECTION, SOLUTION INTRAVENOUS at 17:39

## 2018-01-16 RX ADMIN — BUSPIRONE HYDROCHLORIDE 15 MG: 5 TABLET ORAL at 22:53

## 2018-01-16 RX ADMIN — FENTANYL CITRATE 50 MCG: 50 INJECTION, SOLUTION INTRAMUSCULAR; INTRAVENOUS at 15:48

## 2018-01-16 RX ADMIN — PROPOFOL 180 MG: 10 INJECTION, EMULSION INTRAVENOUS at 14:28

## 2018-01-16 RX ADMIN — MIDAZOLAM HYDROCHLORIDE 2 MG: 1 INJECTION, SOLUTION INTRAMUSCULAR; INTRAVENOUS at 14:20

## 2018-01-16 RX ADMIN — MEPERIDINE HYDROCHLORIDE 12.5 MG: 25 INJECTION INTRAMUSCULAR; INTRAVENOUS; SUBCUTANEOUS at 19:00

## 2018-01-16 RX ADMIN — Medication 80 MCG: at 15:54

## 2018-01-16 RX ADMIN — SODIUM CHLORIDE, POTASSIUM CHLORIDE, SODIUM LACTATE AND CALCIUM CHLORIDE: 600; 310; 30; 20 INJECTION, SOLUTION INTRAVENOUS at 15:54

## 2018-01-16 RX ADMIN — FENTANYL CITRATE 50 MCG: 50 INJECTION, SOLUTION INTRAMUSCULAR; INTRAVENOUS at 15:39

## 2018-01-16 RX ADMIN — FENTANYL CITRATE 100 MCG: 50 INJECTION, SOLUTION INTRAMUSCULAR; INTRAVENOUS at 14:28

## 2018-01-16 RX ADMIN — DOCUSATE SODIUM AND SENNOSIDES 1 TABLET: 8.6; 5 TABLET, FILM COATED ORAL at 22:54

## 2018-01-16 RX ADMIN — Medication 80 MCG: at 15:13

## 2018-01-16 RX ADMIN — Medication 10 ML: at 22:53

## 2018-01-16 RX ADMIN — MIDAZOLAM HYDROCHLORIDE 2 MG: 1 INJECTION, SOLUTION INTRAMUSCULAR; INTRAVENOUS at 14:22

## 2018-01-16 RX ADMIN — FENTANYL CITRATE 50 MCG: 50 INJECTION, SOLUTION INTRAMUSCULAR; INTRAVENOUS at 15:02

## 2018-01-16 RX ADMIN — DEXAMETHASONE SODIUM PHOSPHATE 8 MG: 4 INJECTION, SOLUTION INTRA-ARTICULAR; INTRALESIONAL; INTRAMUSCULAR; INTRAVENOUS; SOFT TISSUE at 14:35

## 2018-01-16 RX ADMIN — METOPROLOL TARTRATE 25 MG: 25 TABLET ORAL at 22:53

## 2018-01-16 RX ADMIN — LIDOCAINE HYDROCHLORIDE 60 MG: 20 INJECTION, SOLUTION EPIDURAL; INFILTRATION; INTRACAUDAL; PERINEURAL at 14:28

## 2018-01-16 RX ADMIN — ROCURONIUM BROMIDE 5 MG: 10 INJECTION, SOLUTION INTRAVENOUS at 14:28

## 2018-01-16 RX ADMIN — SODIUM CHLORIDE, POTASSIUM CHLORIDE, SODIUM LACTATE AND CALCIUM CHLORIDE: 600; 310; 30; 20 INJECTION, SOLUTION INTRAVENOUS at 14:22

## 2018-01-16 RX ADMIN — PROPOFOL 100 MCG/KG/MIN: 10 INJECTION, EMULSION INTRAVENOUS at 14:35

## 2018-01-16 RX ADMIN — ONDANSETRON 4 MG: 2 INJECTION INTRAMUSCULAR; INTRAVENOUS at 16:35

## 2018-01-16 RX ADMIN — Medication 2 G: at 22:53

## 2018-01-16 RX ADMIN — Medication 80 MCG: at 17:01

## 2018-01-16 RX ADMIN — Medication 80 MCG: at 16:15

## 2018-01-16 RX ADMIN — DIVALPROEX SODIUM 500 MG: 500 TABLET, DELAYED RELEASE ORAL at 22:53

## 2018-01-16 RX ADMIN — Medication 80 MCG: at 14:37

## 2018-01-16 RX ADMIN — HYDROMORPHONE HYDROCHLORIDE 0.5 MG: 2 INJECTION, SOLUTION INTRAMUSCULAR; INTRAVENOUS; SUBCUTANEOUS at 15:05

## 2018-01-16 NOTE — ACP (ADVANCE CARE PLANNING)
Request by patient to assist with advance medical directive.  met with pt and spouse in surgical waiting area.  offered brief description of paperwork and answered questions, prior to patient being take to procedure. Spiritual care will continue to follow with pt/spouse for aid with documentation, as able and as needed.      [address] Ancelmo Fletcher M.Div, M.S, Mukund 608 available at [address] [place] [address]([phone]

## 2018-01-16 NOTE — BRIEF OP NOTE
BRIEF OPERATIVE NOTE    Date of Procedure: 1/16/2018   Preoperative Diagnosis: CERVICAL STENOSIS C5-7, MYELOPATHY  Postoperative Diagnosis: CERVICAL STENOSIS C5-7, MYELOPATHY    Procedure(s):  ACF C5-7  Surgeon(s) and Role:     * Gisela Loyola MD - Primary         Assistant Staff:  Physician Assistant: Jaelyn Vazquez PA-C    Surgical Staff:  Circ-1: Sowmya Burns  Circ-Relief: Kristi Kayser, RN  Physician Assistant: Jaelyn Vazquez PA-C  Scrub RN-1: Rony Palacios RN  Scrub RN-Relief: Dorene Fair RN  Event Time In   Incision Start 1501   Incision Close 1647     Anesthesia: General   Estimated Blood Loss: 10 ml  Specimens: * No specimens in log *   Findings: Cervical Stenosis M3-4   Complications: None  Implants:   Implant Name Type Inv.  Item Serial No.  Lot No. LRB No. Used Action   BONE SPCR CERV LORDC 7X9MM --  - [id number]  BONE SPCR CERV LORDC 7X9MM --  [id number] [facility] N/A N/A 1 Implanted   BONE SPCR CERV LORDC 5X7MM --  - [id number]  BONE SPCR CERV LORDC 5X7MM --  [id number] [facility] TISSUE [facility] N/A N/A 1 Implanted   PLATE SPNE CERV 2L ASSURE 36MM --  - SN/A  PLATE SPNE CERV 2L ASSURE 36MM --  N/A [employer] N/A N/A 1 Implanted   SCR SPNE RIDIG ST ASSURE 4X12 --  - SN/A  SCR SPNE RIDIG ST ASSURE 4X12 --  N/A [employer] N/A N/A 6 Implanted   SCR SET RIGID ASSURE --  - SN/A   SCR SET RIGID ASSURE --  N/A [employer] N/A N/A 6 Implanted

## 2018-01-16 NOTE — OP NOTES
[facility]  ACUTE CARE OP NOTE    Name:Melia WELCH  MR#: [id number]  : 1950  ACCOUNT #: [de-identified]   DATE OF SERVICE: 2018    PREOPERATIVE DIAGNOSIS:  Severe cervical stenosis C5-C7 with associated myelopathy. POSTOPERATIVE DIAGNOSIS:  Severe cervical stenosis C5-C7 with associated myelopathy. PROCEDURE PERFORMED:  Anterior cervical diskectomy and fusion, C5-C6, C6-C7, using structural allograft (VG2 supplemented with Globus anterior cervical plating.)      SURGEON:  Dr. Lg Last. ASSISTANT:  PROSPER Lanza.     ANESTHESIA:  General.    ESTIMATED BLOOD LOSS: 10 ml    SPECIMENTS REMOVED: None    COMPLICATIONS: None      INDICATIONS:  A 64-year-old female seen with a sense of unsteadiness in her gait and some numbness in her hands. She has a long history of mechanical lower back pain. She was seen in our office and evaluated with an MRI scan of the cervical, thoracic and lumbar spines. She potentially has a nonunion at the lumbosacral junction causing some mechanical pain, but was found to have severe degenerative disk disease in the neck producing a fairly severe central canal narrowing, particularly at C6-7, to a lesser degree C5-6. She had similar degenerative changes at C3-4, C4-5, but without clear spinal cord compression. Given the extent of her symptoms and findings clinically and radiologically, an anterior decompression and fusion C5-C7 offered using structural allograft as well as an anterior cervical plate. PROCEDURE:  The patient was brought to the operating room in supine position. General anesthetic was administered. Anterior cervical region was then prepped and draped in normal fashion. Spinal cord monitoring was used throughout the operative procedure without change in signal.  A transverse incision was made starting in the midline, extended to the border of the sternocleidomastoid muscle.   Sharp dissection carried down to the level of the platysma. The platysma was split in line with its fibers, exposing deep cervical fascia. Deep cervical fascia was incised along the medial border of the sternocleidomastoid muscle and blunt finger dissection, carried down to the anterior aspect of the spine. X-ray was taken to verify the level. Longus colli muscles were elevated. Retractor blades were placed. I started at the C6-C7 level. [place] pins were used for the purposes of distraction. The annulus was incised with a #15 blade. Using a series of curettes and pituitary rongeurs, I evacuated the entire disk. The patient had large ridging osteophytes anteriorly, all of which were resected using the bur as well as a 2 mm Kerrison. I eventually got back to the posterior wall and eventually got into the epidural space using a 5-0 angled curette. Once I had a rent into the posterior longitudinal ligament, I then used a 2 mm Kerrison to complete a central and neural foraminal decompression. The vertebral bodies undercut to some degree using a Kerrison and an angled curette. Once completed, the dura was well visualized and thought to be free and soft. Probe was passed above and below with ease and without any evidence to suggest further cord compression. A 7 x 9 mm graft chosen for that area. The graft was impacted without difficulty and had excellent overall bony apposition. Good hemostasis was obtained. In similar fashion, I decompressed the C5-6 level. A 5 x 7 mm graft was eventually chosen and placed without difficulty. The ridging osteophytes anteriorly were smoothed off using the Midas Rosalio bur. A 36 mm plate was then secured to the anterior surface of the spine using a series of six screws, each measuring 4 mm in diameter, 12 mm in length, with good purchase. These were locked to the plate using locking screws. Wounds were irrigated. DEJUAN drain was placed.   A piece of Gelfoam soaked in Depo-Medrol placed along the anterior surface. Platysma closed using 2-0 Vicryl. Skin approximated with running 3-0 Vicryl. The patient awoken from the anesthetic, extubated, and taken to recovery in stable condition.           Broderick Upton / TERENCE  D: 01/16/2018 16:40     T: 01/16/2018 17:42  JOB #: [id number]

## 2018-01-16 NOTE — PERIOP NOTES
Dr. Roge Kelly , Anesthesia called, about patient still with oral airway and very sleepy . Patient nods yes and no, but falls asleep while assesment being done. Order received to give Narcan in 15 minutes if patient not more awake. 18:30 patient still a little drowsy, but much better. Narcan not given.  Oral airway removed and on NC 4 Liter/min  With O2 Sats  93%

## 2018-01-16 NOTE — PROGRESS NOTES
Spiritual Care Assessment/Progress Notes    Jayjay Jones [id number]  [id number]    1950  69 y.o.  female    Patient Telephone Number: [phone] (home)   Samaritan Affiliation: Non Judaism   Language: English   Extended Emergency Contact Information  Primary Emergency Contact: Anum Edwards Phone: [phone]  Relation: Spouse   Patient Active Problem List    Diagnosis Date Noted    Essential hypertension 07/21/2017    Mixed hyperlipidemia 07/21/2017    Arthritis 07/21/2017    Fusion of lumbar spine 07/21/2017    Dry eyes, bilateral 07/21/2017    Gastroesophageal reflux disease without esophagitis 07/21/2017        Date: 1/16/2018       Level of Samaritan/Spiritual Activity:  []         Involved in gaston tradition/spiritual practice    []         Not involved in gaston tradition/spiritual practice  [x]         Spiritually oriented    []         Claims no spiritual orientation    []         seeking spiritual identity  []         Feels alienated from Mormon practice/tradition  []         Feels angry about Mormon practice/tradition  [x]         Spirituality/Mormon tradition is a resource for coping at this time.   []         Not able to assess due to medical condition    Services Provided Today:  []         crisis intervention    []         reading Scriptures  []         spiritual assessment    [x]         prayer  []         empathic listening/emotional support  []         rites and rituals (cite in comments)  []         life review     []         Mormon support  []         theological development   []         advocacy  []         ethical dialog     []         blessing  []         bereavement support    []         support to family  []         anticipatory grief support   [x]         help with AMD  []         spiritual guidance    []         meditation      Spiritual Care Needs  []         Emotional Support  []         Spiritual/Samaritan Care  [] Loss/Adjustment  []         Advocacy/Referral                /Ethics  [x]         No needs expressed at               this time  []         Other: (note in               comments)  Spiritual Care Plan  []         Follow up visits with               pt/family  []         Provide materials  []         Schedule sacraments  []         Contact Community               Clergy  [x]         Follow up as needed  []         Other: (note in               comments)     Comments: Request by patient to assist with advance medical directive.  met with pt and spouse in surgical waiting area.  offered brief description of paperwork and answered questions, prior to patient being take to procedure. Spiritual care will continue to follow with pt/spouse for aid with documentation, as able and as needed. Additionally,  offered prayer upon request for pt and her spouse before her procedure.      [address] Ancelmo Fletcher M.Div, M.S, Muk[place] 608 available at [address] [place] [phone]

## 2018-01-16 NOTE — IP AVS SNAPSHOT
[address] [place] [address] 
[phone] Patient: Diogo Diaz MRN: [id number] HAYDER:[birth year] About your hospitalization You were admitted on:  January 16, 2018 You last received care in the:  [address] [place] [address] You were discharged on:  January 17, 2018 Why you were hospitalized Your primary diagnosis was:  Not on File Your diagnoses also included:  Cervical Stenosis Of Spinal Canal, Thoracic Disc Herniation Follow-up Information Follow up With Details Comments Contact Info Neelam Fraire,    [address] [facility] 
[phone] Discharge Orders None A check ryan indicates which time of day the medication should be taken. My Medications START taking these medications Instructions Each Dose to Equal  
 Morning Noon Evening Bedtime  
 oxyCODONE IR 5 mg immediate release tablet Commonly known as:  Jerson Paz Your last dose was: Your next dose is: Take 1 Tab by mouth every three (3) hours as needed. Max Daily Amount: 40 mg.  
 5 mg  
    
   
   
   
  
 senna-docusate 8.6-50 mg per tablet Commonly known as:  Allison Pineda Your last dose was: Your next dose is: Take 1 Tab by mouth two (2) times a day. 1 Tab CONTINUE taking these medications Instructions Each Dose to Equal  
 Morning Noon Evening Bedtime buPROPion 75 mg tablet Commonly known as:  [facility] Your last dose was: Your next dose is: Take 1 Tab by mouth two (2) times a day. 75 mg  
    
   
   
   
  
 busPIRone 15 mg tablet Commonly known as:  BUSPAR Your last dose was: Your next dose is: Take 1 Tab by mouth two (2) times a day. 15 mg  
    
   
   
   
  
 chlorthalidone 25 mg tablet Commonly known as:  Shikha Reyes Your last dose was: Your next dose is: Take 1 Tab by mouth daily. 25 mg  
    
   
   
   
  
 divalproex  mg tablet Commonly known as:  DEPAKOTE Your last dose was: Your next dose is: Take 1 Tab by mouth two (2) times a day. 500 mg LORazepam 1 mg tablet Commonly known as:  ATIVAN Your last dose was: Your next dose is: Take 1 mg by mouth every four (4) hours as needed for Anxiety. 1 mg  
    
   
   
   
  
 metoprolol tartrate 25 mg tablet Commonly known as:  LOPRESSOR Your last dose was: Your next dose is: Take 1 Tab by mouth two (2) times a day. 25 mg PARoxetine 20 mg tablet Commonly known as:  PAXIL Your last dose was: Your next dose is: Take 1 Tab by mouth daily. 20 mg  
    
   
   
   
  
 raNITIdine 150 mg tablet Commonly known as:  ZANTAC Your last dose was: Your next dose is: Take 1 Tab by mouth two (2) times a day. 150 mg  
    
   
   
   
  
 simvastatin 20 mg tablet Commonly known as:  ZOCOR Your last dose was: Your next dose is: Take 1 Tab by mouth nightly. 20 mg Where to Get Your Medications Information on where to get these meds will be given to you by the nurse or doctor. ! Ask your nurse or doctor about these medications  
  oxyCODONE IR 5 mg immediate release tablet  
 senna-docusate 8.6-50 mg per tablet Discharge Instructions After Hospital Care Plan:  Discharge Instructions Neck Surgery Dr. Nivia Garcia Patient Name Misty Hanna) Date of procedure (@ORDATE)  Date of discharge:1/17/2018 Procedure (Procedure(s): 
ACF C5-7) Surgeon (Surgeon(s) and Role: Tavia Quevedo MD - Primary)   PCP: 
 
Follow up appointments 
-follow up with Dr. Nivia Garcia in 2 weeks.   Call [phone], ext [phone] to make an appointment as soon as you get home from the hospital. 
 
When to call your Orthopaedic Surgeon: 
-Signs of infection-if your incision is red; continues to have drainage; drainage has a foul odor or if you have a persistent fever over 101 degrees for 24 hours 
-nausea or vomiting, severe headache 
-loss of bowel or bladder function, inability to urinate 
-changes in sensation in your arms or legs (numbness, tingling, loss of color) 
-increased weakness-greater than before your surgery 
-severe pain or pain not relieved by medications 
-difficulty swallowing 
-Signs of a blood clot in your leg-calf pain, tenderness, redness, swelling of lower leg When to call your Primary Care Physician: 
-Concerns about medical conditions such as diabetes, high blood pressure, asthma, congestive heart failure 
-Call if blood sugars are elevated, persistent headache or dizziness, coughing or congestion, constipation or diarrhea, burning with urination, abnormal heart rate When to call 911and go to the nearest emergency room 
-acute onset of chest pain, shortness of breath, difficulty breathing Activity - Your only exercise should be walking. Start with short frequent walks and increase your walking distance each day. 
-Limit the amount of time you sit to 20-30 minute intervals. Sitting for prolonged periods of time will be uncomfortable for you following surgery. 
-Do NOT lift anything over 5 pounds 
-Do NOT do any neck exercises until you have been instructed by your doctor 
-When you are in bed, you may lay on your back or on either side. Do NOT lie on your stomach or completely flat. Keep elevated when sleeping. Neck Collar 
-You are required to wear your cervical collar at all times. You may remove the collar long enough to change the pads when needed and to change your dressing each day 
-Do not bend or twist when your brace is off.   It is best to have someone assist you when changing the pads and your dressing to prevent you from bending your neck. Driving 
-You may not drive or return to work until instructed by your physician. However, you may ride in the car for short periods of time. Incision Care 
-Keep a dry dressing (ABD and paper tape) on your incision and have it changed daily; more often if your incision is draining. Have your caregiver wash their hands thoroughly before changing your dressing. 
-You will have absorbable sutures and steristrips (white tape) on your incision. Leave the steristrips on until they fall off. 
-Do not rub or apply any lotions or ointments to your incision site. Showering 
-You may shower in approximately 4 days after your surgery.   
 
-Leave the dressing on during your shower allowing the water to run directly over it. Once you get out of the shower, pat the incision dry with a clean towel and put on a dry dressing. Your surgeon will discuss with you if you should shower with or without the collar.   
 
-Reminder- neck collars need to be worn even while in the shower. Make sure you put dry pads on your collar after your shower. 
 
-Do not take a tub bath. Preventing blood clots 
-You have been given T.E.D. stockings to wear. Continue to wear these for 7 days after your discharge. Put them on in the morning and take them off at night.   
 
-They are used to increase your circulation and prevent blood clots from forming in your legs Pain management 
-take pain medication as prescribed; decrease the amount you use as your pain lessens 
-avoid alcoholic beverages while taking pain medication 
-avoid NSAIDS (Aleve, Ibuprofen, Aspirin) until your surgeon approves it 
-Please be aware that many medications contain Tylenol. We do not want you to over medicate so please read the information below as a guide. Do not take more than 4 Grams of Tylenol in a 24 hour period. (There are 1000 milligrams in one Gram) Percocet contains 325 mg of Tylenol per tablet (do not take more than 12 tablets in 24 hours) Lortab contains 500 mg of Tylenol per tablet (do not take more than 8 tablets in 24 hours) Norco contains 325 mg of Tylenol per tablet (do not take more than 12 tablets in 24 hours). Diet 
-resume usual diet but you should eat soft foods for a few days 
-drink plenty of fluids; eat foods high in fiber 
-avoid alcoholic beverages and tobacco products -It is important to have regular bowel movements. Pain medications may cause constipation. You may want to take a stool softener (such as Senokot-S or Colace); increase your water and fiber intake, or prune juice to prevent constipation. If constipation occurs, take a laxative (such as Dulcolax tablets, Milk of Magnesia, or a suppository). Laxatives should only be used if the above preventable measures have failed and you still have not had a bowel movement after three days [employer] Announcement We are excited to announce that we are making your provider's discharge notes available to you in [employer]. You will see these notes when they are completed and signed by the physician that discharged you from your recent hospital stay. If you have any questions or concerns about any information you see in [employer], please call the Health Information Department where you were seen or reach out to your Primary Care Provider for more information about your plan of care. Introducing [facility] [place] [facility]! [address] [facility] introduces [employer] patient portal. Now you can access parts of your medical record, email your doctor's office, and request medication refills online. 1. In your internet browser, go to [email or web address] 2. Click on the First Time User? Click Here link in the Sign In box. You will see the New Member Sign Up page. 3. Enter your [employer] Access Code exactly as it appears below.  You will not need to use this code after youve completed the sign-up process. If you do not sign up before the expiration date, you must request a new code. · [employer] Access Code: [email or web address] Expires: 2/15/2018  4:48 PM 
 
4. Enter the last four digits of your Social Security Number (xxxx) and Date of Birth (mm/dd/yyyy) as indicated and click Submit. You will be taken to the next sign-up page. 5. Create a [employer]ify ID. This will be your [employer]ify login ID and cannot be changed, so think of one that is secure and easy to remember. 6. Create a [employer] password. You can change your password at any time. 7. Enter your Password Reset Question and Answer. This can be used at a later time if you forget your password. 8. Enter your e-mail address. You will receive e-mail notification when new information is available in [address] 9. Click Sign Up. You can now view and download portions of your medical record. 10. Click the Download Summary menu link to download a portable copy of your medical information. If you have questions, please visit the Frequently Asked Questions section of the [employer] website. Remember, [employer] is NOT to be used for urgent needs. For medical emergencies, dial 911. Now available from your iPhone and Android! Providers Seen During Your Hospitalization Provider Specialty Primary office phone Piter Garza MD Orthopedic Surgery [phone] Your Primary Care Physician (PCP) Primary Care Physician Office Phone Office Fax Silvia Mendenhall [phone] You are allergic to the following Allergen Reactions Penicillins Rash Recent Documentation Height Weight BMI OB Status Smoking Status 1.676 m 108 kg 38.41 kg/m2 Postmenopausal Never Smoker Emergency Contacts Name Discharge Info Relation Home Work Mobile Sonido Bass CAREGIVER [3] Spouse [3] [phone] Patient Belongings The following personal items are in your possession at time of discharge: 
  Dental Appliances: None  Visual Aid: None             Clothing: Other (comment) (bag of clothes returned to patient) Please provide this summary of care documentation to your next provider. Signatures-by signing, you are acknowledging that this After Visit Summary has been reviewed with you and you have received a copy. Patient Signature:  ____________________________________________________________ Date:  ____________________________________________________________  
  
[facility] Provider Signature:  ____________________________________________________________ Date:  ____________________________________________________________

## 2018-01-16 NOTE — ANESTHESIA PREPROCEDURE EVALUATION
Anesthetic History   No history of anesthetic complications            Review of Systems / Medical History  Patient summary reviewed, nursing notes reviewed and pertinent labs reviewed    Pulmonary  Within defined limits                 Neuro/Psych   Within defined limits           Cardiovascular  Within defined limits  Hypertension                   GI/Hepatic/Renal  Within defined limits       Renal disease       Endo/Other  Within defined limits      Obesity and arthritis     Other Findings              Physical Exam    Airway  Mallampati: II  TM Distance: > 6 cm  Neck ROM: normal range of motion   Mouth opening: Normal     Cardiovascular  Regular rate and rhythm,  S1 and S2 normal,  no murmur, click, rub, or gallop             Dental  No notable dental hx       Pulmonary  Breath sounds clear to auscultation               Abdominal  GI exam deferred       Other Findings            Anesthetic Plan    ASA: 3  Anesthesia type: general          Induction: Intravenous  Anesthetic plan and risks discussed with: Patient

## 2018-01-17 VITALS
TEMPERATURE: 97.6 F | RESPIRATION RATE: 16 BRPM | HEIGHT: 66 IN | WEIGHT: 238 LBS | BODY MASS INDEX: 38.25 KG/M2 | DIASTOLIC BLOOD PRESSURE: 81 MMHG | SYSTOLIC BLOOD PRESSURE: 122 MMHG | HEART RATE: 72 BPM | OXYGEN SATURATION: 92 %

## 2018-01-17 LAB
ANION GAP SERPL CALC-SCNC: 8 MMOL/L (ref 5–15)
BUN SERPL-MCNC: 19 MG/DL (ref 6–20)
BUN/CREAT SERPL: 13 (ref 12–20)
CALCIUM SERPL-MCNC: 8.2 MG/DL (ref 8.5–10.1)
CHLORIDE SERPL-SCNC: 104 MMOL/L (ref 97–108)
CO2 SERPL-SCNC: 25 MMOL/L (ref 21–32)
CREAT SERPL-MCNC: 1.44 MG/DL (ref 0.55–1.02)
GLUCOSE SERPL-MCNC: 118 MG/DL (ref 65–100)
POTASSIUM SERPL-SCNC: 4.6 MMOL/L (ref 3.5–5.1)
SODIUM SERPL-SCNC: 137 MMOL/L (ref 136–145)

## 2018-01-17 PROCEDURE — 74011250637 HC RX REV CODE- 250/637

## 2018-01-17 PROCEDURE — 51798 US URINE CAPACITY MEASURE: CPT

## 2018-01-17 PROCEDURE — 36415 COLL VENOUS BLD VENIPUNCTURE: CPT | Performed by: PHYSICIAN ASSISTANT

## 2018-01-17 PROCEDURE — 74011250637 HC RX REV CODE- 250/637: Performed by: PHYSICIAN ASSISTANT

## 2018-01-17 PROCEDURE — 97161 PT EVAL LOW COMPLEX 20 MIN: CPT

## 2018-01-17 PROCEDURE — 80048 BASIC METABOLIC PNL TOTAL CA: CPT | Performed by: PHYSICIAN ASSISTANT

## 2018-01-17 PROCEDURE — G8979 MOBILITY GOAL STATUS: HCPCS

## 2018-01-17 PROCEDURE — G8978 MOBILITY CURRENT STATUS: HCPCS

## 2018-01-17 PROCEDURE — 97116 GAIT TRAINING THERAPY: CPT

## 2018-01-17 PROCEDURE — 99218 HC RM OBSERVATION: CPT

## 2018-01-17 PROCEDURE — 74011250636 HC RX REV CODE- 250/636: Performed by: PHYSICIAN ASSISTANT

## 2018-01-17 RX ORDER — AMOXICILLIN 250 MG
1 CAPSULE ORAL 2 TIMES DAILY
Qty: 60 TAB | Refills: 0 | Status: SHIPPED | OUTPATIENT
Start: 2018-01-17

## 2018-01-17 RX ORDER — TRAZODONE HYDROCHLORIDE 50 MG/1
50 TABLET ORAL ONCE
Status: COMPLETED | OUTPATIENT
Start: 2018-01-17 | End: 2018-01-17

## 2018-01-17 RX ORDER — OXYCODONE HYDROCHLORIDE 5 MG/1
5 TABLET ORAL
Qty: 60 TAB | Refills: 0 | Status: SHIPPED | OUTPATIENT
Start: 2018-01-17 | End: 2018-04-26

## 2018-01-17 RX ORDER — TRAZODONE HYDROCHLORIDE 50 MG/1
TABLET ORAL
Status: COMPLETED
Start: 2018-01-17 | End: 2018-01-17

## 2018-01-17 RX ADMIN — Medication 2 G: at 06:21

## 2018-01-17 RX ADMIN — PAROXETINE HYDROCHLORIDE 20 MG: 20 TABLET, FILM COATED ORAL at 10:10

## 2018-01-17 RX ADMIN — TRAZODONE HYDROCHLORIDE: 50 TABLET ORAL at 03:00

## 2018-01-17 RX ADMIN — BUPROPION HYDROCHLORIDE 75 MG: 75 TABLET, FILM COATED ORAL at 10:15

## 2018-01-17 RX ADMIN — BUPROPION HYDROCHLORIDE 75 MG: 75 TABLET, FILM COATED ORAL at 00:17

## 2018-01-17 RX ADMIN — METOPROLOL TARTRATE 25 MG: 25 TABLET ORAL at 10:10

## 2018-01-17 RX ADMIN — OXYCODONE HYDROCHLORIDE 5 MG: 5 TABLET ORAL at 10:09

## 2018-01-17 RX ADMIN — DIVALPROEX SODIUM 500 MG: 500 TABLET, DELAYED RELEASE ORAL at 10:09

## 2018-01-17 RX ADMIN — DOCUSATE SODIUM AND SENNOSIDES 1 TABLET: 8.6; 5 TABLET, FILM COATED ORAL at 10:09

## 2018-01-17 RX ADMIN — OXYCODONE HYDROCHLORIDE 5 MG: 5 TABLET ORAL at 13:58

## 2018-01-17 RX ADMIN — POLYETHYLENE GLYCOL 3350 17 G: 17 POWDER, FOR SOLUTION ORAL at 10:09

## 2018-01-17 RX ADMIN — Medication 10 ML: at 06:21

## 2018-01-17 RX ADMIN — BUSPIRONE HYDROCHLORIDE 15 MG: 5 TABLET ORAL at 10:09

## 2018-01-17 NOTE — PROGRESS NOTES
Chart reviewed for transitions of care needs. Pt admitted for ACF C5-7. Patient has been cleared by therapy with no further recommendations noted. Pt has a rollator, rolling walker, and SPC's. Pt will return home with outpatient follow up. Care Management Interventions  PCP Verified by CM: Yes  Mode of Transport at Discharge:  Other (see comment) (car)  Transition of Care Consult (CM Consult): Discharge Planning  MyChart Signup: No  Discharge Durable Medical Equipment: No  Health Maintenance Reviewed: Yes  Physical Therapy Consult: Yes  Occupational Therapy Consult: No  Speech Therapy Consult: No  Current Support Network: Own Home  Confirm Follow Up Transport: Family  Plan discussed with Pt/Family/Caregiver: Yes  Freedom of Choice Offered: Yes  Discharge Location  Discharge Placement: Home    MELISSA Steiner

## 2018-01-17 NOTE — DISCHARGE INSTRUCTIONS
After Hospital Care Plan:  Discharge Instructions Neck Surgery  Dr. David Rey  Patient Name Urbano Saunders)  Date of procedure (@ORDATE)  Date of discharge:1/17/2018  Procedure (Procedure(s):  ACF C5-7)  Surgeon (Surgeon(s) and Role:     * Miguel King MD - Primary)   PCP:    Follow up appointments  -follow up with Dr. David Rey in 2 weeks. Call [phone], ext [phone] to make an appointment as soon as you get home from the hospital.    When to call your Orthopaedic Surgeon:  -Signs of infection-if your incision is red; continues to have drainage; drainage has a foul odor or if you have a persistent fever over 101 degrees for 24 hours  -nausea or vomiting, severe headache  -loss of bowel or bladder function, inability to urinate  -changes in sensation in your arms or legs (numbness, tingling, loss of color)  -increased weakness-greater than before your surgery  -severe pain or pain not relieved by medications  -difficulty swallowing  -Signs of a blood clot in your leg-calf pain, tenderness, redness, swelling of lower leg    When to call your Primary Care Physician:  -Concerns about medical conditions such as diabetes, high blood pressure, asthma, congestive heart failure  -Call if blood sugars are elevated, persistent headache or dizziness, coughing or congestion, constipation or diarrhea, burning with urination, abnormal heart rate  When to call 811and go to the nearest emergency room  -acute onset of chest pain, shortness of breath, difficulty breathing    Activity  - Your only exercise should be walking. Start with short frequent walks and increase your walking distance each day.  -Limit the amount of time you sit to 20-30 minute intervals.   Sitting for prolonged periods of time will be uncomfortable for you following surgery.  -Do NOT lift anything over 5 pounds  -Do NOT do any neck exercises until you have been instructed by your doctor  -When you are in bed, you may lay on your back or on either side.  Do NOT lie on your stomach or completely flat. Keep elevated when sleeping. Neck Collar  -You are required to wear your cervical collar at all times. You may remove the collar long enough to change the pads when needed and to change your dressing each day  -Do not bend or twist when your brace is off. It is best to have someone assist you when changing the pads and your dressing to prevent you from bending your neck. Driving  -You may not drive or return to work until instructed by your physician. However, you may ride in the car for short periods of time. Incision Care  -Keep a dry dressing (ABD and paper tape) on your incision and have it changed daily; more often if your incision is draining. Have your caregiver wash their hands thoroughly before changing your dressing.  -You will have absorbable sutures and steristrips (white tape) on your incision. Leave the steristrips on until they fall off.  -Do not rub or apply any lotions or ointments to your incision site. Showering  -You may shower in approximately 4 days after your surgery.      -Leave the dressing on during your shower allowing the water to run directly over it. Once you get out of the shower, pat the incision dry with a clean towel and put on a dry dressing. Your surgeon will discuss with you if you should shower with or without the collar.      -Reminder- neck collars need to be worn even while in the shower. Make sure you put dry pads on your collar after your shower.    -Do not take a tub bath. Preventing blood clots  -You have been given T.E.D. stockings to wear. Continue to wear these for 7 days after your discharge.   Put them on in the morning and take them off at night.      -They are used to increase your circulation and prevent blood clots from forming in your legs    Pain management  -take pain medication as prescribed; decrease the amount you use as your pain lessens  -avoid alcoholic beverages while taking pain medication  -avoid NSAIDS (Aleve, Ibuprofen, Aspirin) until your surgeon approves it  -Please be aware that many medications contain Tylenol. We do not want you to over medicate so please read the information below as a guide. Do not take more than 4 Grams of Tylenol in a 24 hour period. (There are 1000 milligrams in one Gram)  Percocet contains 325 mg of Tylenol per tablet (do not take more than 12 tablets in 24 hours)  Lortab contains 500 mg of Tylenol per tablet (do not take more than 8 tablets in 24 hours)  Norco contains 325 mg of Tylenol per tablet (do not take more than 12 tablets in 24 hours). Diet  -resume usual diet but you should eat soft foods for a few days  -drink plenty of fluids; eat foods high in fiber  -avoid alcoholic beverages and tobacco products  -It is important to have regular bowel movements. Pain medications may cause constipation. You may want to take a stool softener (such as Senokot-S or Colace); increase your water and fiber intake, or prune juice to prevent constipation. If constipation occurs, take a laxative (such as Dulcolax tablets, Milk of Magnesia, or a suppository).   Laxatives should only be used if the above preventable measures have failed and you still have not had a bowel movement after three days

## 2018-01-17 NOTE — PROGRESS NOTES
Problem: Mobility Impaired (Adult and Pediatric)  Goal: *Acute Goals and Plan of Care (Insert Text)  Physical Therapy Goals  Initiated 1/17/2018    1. Patient will move from supine to sit and sit to supine , scoot up and down and roll side to side in bed with modified independence within 4 days. 2. Patient will perform sit to stand with modified independence within 4 days. 3. Patient will ambulate with modified independence for 200 feet with the least restrictive device within 4 days. 4. Patient will verbalize and demonstrate understanding of spinal precautions (No bending, lifting greater than 5 lbs, or twisting; log-roll technique; frequent repositioning as instructed) within 4 days. physical Therapy EVALUATION  Patient: Brenda Carias (45 y.o. female)  Date: 1/17/2018  Primary Diagnosis: CERVICAL STENOSIS C5-7, MYELOPATHY  Cervical stenosis of spinal canal  Procedure(s) (LRB):  ACF C5-7 (N/A) 1 Day Post-Op   Precautions:   Back (brace )    ASSESSMENT :  Based on the objective data described below, the patient presents with generalized weakness, decreased balance, and decreased activity tolerance following admission for ACDF C5-7 POD 1. PTA patient reports using a rollator for ambulation and did not require supplemental O2. Currently requires 2L O2 at rest and with ambulation for sats to remain 90-92%. Patient is overall SBA for observed transfers. She ambulated 110 feet with rollator and SBA. Slow maira secondary to back pain which she had at baseline. She reports having no stairs to navigate in the home. She is cleared for discharge from PT standpoint, RN aware. Recommend HHPT vs no additional services at discharge. Patient is cleared for discharge from PT standpoint:  YES [x]     NO []       Patient will benefit from skilled intervention to address the above impairments.   Patients rehabilitation potential is considered to be Good  Factors which may influence rehabilitation potential include:   []         None noted  []         Mental ability/status  [x]         Medical condition  []         Home/family situation and support systems  []         Safety awareness  []         Pain tolerance/management  []         Other:      PLAN :  Recommendations and Planned Interventions:  [x]           Bed Mobility Training             [x]    Neuromuscular Re-Education  [x]           Transfer Training                   []    Orthotic/Prosthetic Training  [x]           Gait Training                         []    Modalities  [x]           Therapeutic Exercises           []    Edema Management/Control  [x]           Therapeutic Activities            [x]    Patient and Family Training/Education  []           Other (comment):    Frequency/Duration: Patient will be followed by physical therapy  twice daily to address goals. Discharge Recommendations: Home Health and None  Further Equipment Recommendations for Discharge: Owns rollator and RW, SPC's     SUBJECTIVE:   Patient stated I originally went to the doc for back pain but then they found my cervical spine compressed.     OBJECTIVE DATA SUMMARY:   HISTORY:    Past Medical History:   Diagnosis Date    Chronic kidney disease     Depression     Hypercholesterolemia     Hypertension     Thoracic disc herniation      Past Surgical History:   Procedure Laterality Date    HX APPENDECTOMY      HX CHOLECYSTECTOMY      HX LUMBAR FUSION      HX ORTHOPAEDIC      HX TONSIL AND ADENOIDECTOMY       Prior Level of Function/Home Situation: mod I with rollator  Personal factors and/or comorbidities impacting plan of care:     Home Situation  Home Environment: Apartment  One/Two Story Residence: One story    EXAMINATION/PRESENTATION/DECISION MAKING:   Critical Behavior:  Neurologic State: Alert           Hearing:     Skin:    Edema:   Range Of Motion:  AROM: Within functional limits           PROM: Within functional limits           Strength:    Strength: Generally decreased, functional                    Tone & Sensation:                                  Coordination:     Vision:      Functional Mobility:  Bed Mobility:     Supine to Sit:  (received sitting EOB)        Transfers:  Sit to Stand: Stand-by asssistance  Stand to Sit: Stand-by asssistance        Bed to Chair: Stand-by asssistance              Balance:   Sitting: Intact  Standing: Intact; With support  Ambulation/Gait Training:  Distance (ft): 110 Feet (ft)  Assistive Device: Walker, rollator  Ambulation - Level of Assistance: Stand-by asssistance        Gait Abnormalities: Decreased step clearance        Base of Support: Widened     Speed/Erika: Pace decreased (<100 feet/min)  Step Length: Right shortened;Left shortened                     Stairs: Therapeutic Exercises:       Functional Measure:  Tinetti test:    Sitting Balance: 1  Arises: 1  Attempts to Rise: 1  Immediate Standing Balance: 1  Standing Balance: 1  Nudged: 1  Eyes Closed: 0  Turn 360 Degrees - Continuous/Discontinuous: 1  Turn 360 Degrees - Steady/Unsteady: 1  Sitting Down: 1  Balance Score: 9  Indication of Gait: 1  R Step Length/Height: 1  L Step Length/Height: 1  R Foot Clearance: 1  L Foot Clearance: 1  Step Symmetry: 1  Step Continuity: 1  Path: 1  Trunk: 1  Walking Time: 0  Gait Score: 9  Total Score: 18       Tinetti Test and G-code impairment scale:  Percentage of Impairment CH    0%   CI    1-19% CJ    20-39% CK    40-59% CL    60-79% CM    80-99% CN     100%   Tinetti  Score 0-28 28 23-27 17-22 12-16 6-11 1-5 0       Tinetti Tool Score Risk of Falls  <19 = High Fall Risk  19-24 = Moderate Fall Risk  25-28 = Low Fall Risk  Tinetti ME. Performance-Oriented Assessment of Mobility Problems in Elderly Patients. [facility] 66; [id number].  (Scoring Description: PT Bulletin Feb. 10, 1993)    Older adults: Nilton  et al, 2009; n = [address] elderly evaluated with ABC, NELL, ADL, and IADL)  · Mean NELL score for males aged 69-68 years = 26.21(3.40)  · Mean NELL score for females age 69-68 years = 25.16(4.30)  · Mean NELL score for males over 80 years = 23.29(6.02)  · Mean NELL score for females over 80 years = 17.20(8.32)         G codes: In compliance with CMSs Claims Based Outcome Reporting, the following G-code set was chosen for this patient based on their primary functional limitation being treated: The outcome measure chosen to determine the severity of the functional limitation was the Tinetti with a score of 18/28 which was correlated with the impairment scale. ? Mobility - Walking and Moving Around:     - CURRENT STATUS: CJ - 20%-39% impaired, limited or restricted    - GOAL STATUS: CI - 1%-19% impaired, limited or restricted    - D/C STATUS:  ---------------To be determined---------------        Pain:  Pain Scale 1: Numeric (0 - 10)  Pain Intensity 1: 1  Pain Location 1: Neck  Pain Orientation 1: Mid  Pain Description 1: Aching  Pain Intervention(s) 1: Medication (see MAR)  Activity Tolerance:   Requires 2L supplemental O2 at rest and with activity  Please refer to the flowsheet for vital signs taken during this treatment. After treatment:   [x]         Patient left in no apparent distress sitting up in chair  []         Patient left in no apparent distress in bed  [x]         Call bell left within reach  [x]         Nursing notified  [x]         Caregiver present  []         Bed alarm activated    COMMUNICATION/EDUCATION:   The patients plan of care was discussed with: Registered Nurse. [x]         Fall prevention education was provided and the patient/caregiver indicated understanding. [x]         Patient/family have participated as able in goal setting and plan of care. [x]         Patient/family agree to work toward stated goals and plan of care. []         Patient understands intent and goals of therapy, but is neutral about his/her participation.   []         Patient is unable to participate in goal setting and plan of care.     Thank you for this referral.  Nury Valentino, PT   Time Calculation: 26 mins

## 2018-01-17 NOTE — DISCHARGE SUMMARY
[address] [place], [place] [address] [address]  [phone]     Discharge Summary       PATIENT ID: Blanca Wong  MRN: [id number]   YOB: 1950    DATE OF ADMISSION: 1/16/2018 12:18 PM    DATE OF DISCHARGE: 1/17/2018   PRIMARY CARE PROVIDER: Goyo York DO     CONSULTATIONS: None    PROCEDURES/SURGERIES: Procedure(s):  ACF C5-7    History of Present Illness:  Blanca Wong is a 69 y.o. female with a history of chronic back pain, depression, hypertension, and CKD. She has undergone a prior L2-S1 fusion in [place], [place]. She has a T11-T12 herniated disc causing lower right thoracic pain with radiation to the right side of her abdomen. She has received ESIs with [facility]. She presented to Dr. Link Johnson clinic with complaints of her thoracic and lumbar pain as well as trouble with gait instability and numbness in her hands. Her MRI showed severe DDD with severe central canal narrowing at C5-6 and C6-7, most severe at the C6-7 level. After failing conservative therapy and a discussion of the risks, benefits, alternatives, perioperative course, and potential complications of surgery, she consented to undergo a Procedure(s):  ACF C5-7. Hospital Course:  Blanca Wong tolerated the procedure well. She was transferred  to the recovery room in stable condition. After a brief stay the patient was then transferred to the Spinal Surgery Unit at [address] [place] [address].  On postoperative day #1, the dressing was clean and dry, she was neurovascularly intact. The patient was afebrile and vital signs were stable. Calves were soft and non-tender bilaterally. The patient was tolerating a regular diet and making progress with physical therapy. She was voiding without difficulty. She had no dysphagia or dysyphonia. Her cervical drain was removed.  She had a properly fitted cervical collar in place.    Hemoglobin prior to discharge were   Lab Results   Component Value Date/Time    HGB 14.6 07/21/2017 03:22 PM        Tiny Cosby made satisfactory progress with physical therapy and was discharged to Home in stable condition on postoperative day 1. She was provided with routine postoperative instructions and advised to follow up with Mata Figueredo MD in 2 weeks following discharge from the hospital.          FOLLOW UP APPOINTMENTS:    Follow-up Information     Follow up With Details Comments [address] Whitney Koroma DO   [address]  [phone]             ADDITIONAL CARE RECOMMENDATIONS:     When to call your Primary Care Physician:  -Concerns about medical conditions such as diabetes, high blood pressure, asthma, congestive heart failure  -Call if blood sugars are elevated, persistent headache or dizziness, coughing or congestion, constipation or diarrhea, burning with urination, abnormal heart rate  When to call 911and go to the nearest emergency room  -acute onset of chest pain, shortness of breath, difficulty breathing    Activity  - Your only exercise should be walking. Start with short frequent walks and increase your walking distance each day.  -Limit the amount of time you sit to 20-30 minute intervals. Sitting for prolonged periods of time will be uncomfortable for you following surgery.  -Do NOT lift anything over 5 pounds  -Do NOT do any neck exercises until you have been instructed by your doctor  -When you are in bed, you may lay on your back or on either side. Do NOT lie on your stomach or completely flat. Keep elevated when sleeping. Neck Collar  -You are required to wear your cervical collar at all times. You may remove the collar long enough to change the pads when needed and to change your dressing each day  -Do not bend or twist when your brace is off.   It is best to have someone assist you when changing the pads and your dressing to prevent you from bending your neck. Driving  -You may not drive or return to work until instructed by your physician. However, you may ride in the car for short periods of time. Incision Care  -Keep a dry dressing (ABD and paper tape) on your incision and have it changed daily; more often if your incision is draining. Have your caregiver wash their hands thoroughly before changing your dressing.  -You will have absorbable sutures and steristrips (white tape) on your incision. Leave the steristrips on until they fall off.  -Do not rub or apply any lotions or ointments to your incision site. Showering  -You may shower in approximately 4 days after your surgery.      -Leave the dressing on during your shower allowing the water to run directly over it. Once you get out of the shower, pat the incision dry with a clean towel and put on a dry dressing. Your surgeon will discuss with you if you should shower with or without the collar.      -Reminder- neck collars need to be worn even while in the shower. Make sure you put dry pads on your collar after your shower.    -Do not take a tub bath. Preventing blood clots  -You have been given T.E.D. stockings to wear. Continue to wear these for 7 days after your discharge. Put them on in the morning and take them off at night.      -They are used to increase your circulation and prevent blood clots from forming in your legs    Pain management  -take pain medication as prescribed; decrease the amount you use as your pain lessens  -avoid alcoholic beverages while taking pain medication  -avoid NSAIDS (Aleve, Ibuprofen, Aspirin) until your surgeon approves it  -Please be aware that many medications contain Tylenol. We do not want you to over medicate so please read the information below as a guide. Do not take more than 4 Grams of Tylenol in a 24 hour period.   (There are 1000 milligrams in one Gram)  Percocet contains 325 mg of Tylenol per tablet (do not take more than 12 tablets in 24 hours)  Lortab contains 500 mg of Tylenol per tablet (do not take more than 8 tablets in 24 hours)  Norco contains 325 mg of Tylenol per tablet (do not take more than 12 tablets in 24 hours). Diet  -resume usual diet but you should eat soft foods for a few days  -drink plenty of fluids; eat foods high in fiber  -avoid alcoholic beverages and tobacco products  -It is important to have regular bowel movements. Pain medications may cause constipation. You may want to take a stool softener (such as Senokot-S or Colace); increase your water and fiber intake, or prune juice to prevent constipation. If constipation occurs, take a laxative (such as Dulcolax tablets, Milk of Magnesia, or a suppository). Laxatives should only be used if the above preventable measures have failed and you still have not had a bowel movement after three days    DISCHARGE MEDICATIONS:  Current Discharge Medication List      START taking these medications    Details   oxyCODONE IR (ROXICODONE) 5 mg immediate release tablet Take 1 Tab by mouth every three (3) hours as needed. Max Daily Amount: 40 mg.  Qty: 60 Tab, Refills: 0    Associated Diagnoses: Cervical stenosis of spinal canal      senna-docusate (PERICOLACE) 8.6-50 mg per tablet Take 1 Tab by mouth two (2) times a day. Qty: 60 Tab, Refills: 0         CONTINUE these medications which have NOT CHANGED    Details   LORazepam (ATIVAN) 1 mg tablet Take 1 mg by mouth every four (4) hours as needed for Anxiety. metoprolol tartrate (LOPRESSOR) 25 mg tablet Take 1 Tab by mouth two (2) times a day. Qty: 60 Tab, Refills: 0    Associated Diagnoses: Essential hypertension      chlorthalidone (HYGROTEN) 25 mg tablet Take 1 Tab by mouth daily. Qty: 30 Tab, Refills: 0    Associated Diagnoses: Essential hypertension      simvastatin (ZOCOR) 20 mg tablet Take 1 Tab by mouth nightly.   Qty: 30 Tab, Refills: 0    Associated Diagnoses: Mixed hyperlipidemia PARoxetine (PAXIL) 20 mg tablet Take 1 Tab by mouth daily. Qty: 30 Tab, Refills: 0    Associated Diagnoses: Depression with anxiety      buPROPion (WELLBUTRIN) 75 mg tablet Take 1 Tab by mouth two (2) times a day. Qty: 60 Tab, Refills: 0    Associated Diagnoses: Depression with anxiety      busPIRone (BUSPAR) 15 mg tablet Take 1 Tab by mouth two (2) times a day. Qty: 60 Tab, Refills: 0    Associated Diagnoses: Depression with anxiety      raNITIdine (ZANTAC) 150 mg tablet Take 1 Tab by mouth two (2) times a day. Qty: 60 Tab, Refills: 0    Associated Diagnoses: Gastroesophageal reflux disease without esophagitis      divalproex DR (DEPAKOTE) 500 mg tablet Take 1 Tab by mouth two (2) times a day. Qty: 60 Tab, Refills: 0    Associated Diagnoses: Mood disorder ([address]             PHYSICAL EXAMINATION AT DISCHARGE:  General: Pleasant, alert, cooperative, no distress. EENT: EOMI. Anicteric sclerae. Oral mucous moist, oropharynx benign. Resp: Equal chest expansion No accessory muscle use. CV: No cyanosis or clubbing. No edema appreciated in the extremities. Gastrointestinal:  Soft, non-tender, non-distended. normoactive bowel sounds, no hepatosplenomegaly  Neurological: Follows commands. KEARNS. Speech clear. Sensation intact to light touch. Motor: unchanged C5-T1 and L2-S1. Musculoskeletal:  Calves soft, supple, non-tender upon palpation or with passive stretch. Psych: Good insight. Not anxious nor agitated. Skin: Good turgor. No rashes or lesions. Incision - clean, dry and intact. No significant erythema or swelling.       CHRONIC MEDICAL DIAGNOSES:  Problem List as of 1/17/2018  Date Reviewed: 1/16/2018          Codes Class Noted - Resolved    Thoracic disc herniation (Chronic) ICD-10-CM: M51.24  ICD-9-CM: 722.11  Unknown - Present        Cervical stenosis of spinal canal ICD-10-CM: M48.02  ICD-9-CM: 723.0  1/16/2018 - Present        Essential hypertension ICD-10-CM: I10  ICD-9-CM: 401.9  7/21/2017 - Present        Mixed hyperlipidemia ICD-10-CM: E78.2  ICD-9-CM: 272.2  7/21/2017 - Present        Arthritis ICD-10-CM: M19.90  ICD-9-CM: 716.90  7/21/2017 - Present        Fusion of lumbar spine ICD-10-CM: M43.26  ICD-9-CM: 724.9  7/21/2017 - Present        Dry eyes, bilateral ICD-10-CM: E54.147  ICD-9-CM: 375.15  7/21/2017 - Present        Gastroesophageal reflux disease without esophagitis ICD-10-CM: K21.9  ICD-9-CM: 530.81  7/21/2017 - Present              Signed:   Joe Garcia NP  1/17/2018  2:35 PM

## 2018-01-17 NOTE — DISCHARGE SUMMARY
[address] [place] [address].    DISCHARGE SUMMARY     Patient: Maite Montelongo                             Medical Record Number: [id number]                : 1950  Age: 69 y.o. Admit Date: 2018  Discharge Date: 2018  Admission Diagnosis: CERVICAL STENOSIS C5-7, MYELOPATHY  Cervical stenosis of spinal canal  Discharge Diagnosis: CERVICAL STENOSIS C5-7, MYELOPATHY  Procedures: Procedure(s):  ACF C5-7  Surgeon:JASPREET Payne MD  Anesthesia: general  Complications: None       Hospital Course:  Maite Montelongo was admitted the same day of surgery and underwent Procedure(s):  ACF C5-7 and tolerated the procedure well. She was transferred  to the recovery room in stable condition. After a brief stay the patient was then transferred to the Spine Surgery Unit at [address] [place] [address].  On postoperative day #1, the dressing was clean and dry, she was neurovascularly intact. The patient was afebrile and vital signs were stable. Calves were soft and non-tender bilaterally. On postoperative day  # 2, the patient was tolerating a regular diet and making satisfactory progress with physical therapy. Hemoglobin and INR prior to discharge were     Lab Results   Component Value Date/Time    HGB 14.6 2017 03:22 PM      Maite Montelongo made satisfactory progress with physical therapy and was discharged to Home in stable condition on postoperative day 1. She was provided with routine postoperative instructions and advised to follow up in my office in 2 weeks following discharge from the hospital.  She was prescribed pain medication for post operative analgesia. Discharge Medications:  Current Discharge Medication List      START taking these medications    Details   oxyCODONE IR (ROXICODONE) 5 mg immediate release tablet Take 1 Tab by mouth every three (3) hours as needed.  Max Daily Amount: 40 mg.  Qty: 60 Tab, Refills: 0    Associated Diagnoses: Cervical stenosis of spinal canal      senna-docusate (PERICOLACE) 8.6-50 mg per tablet Take 1 Tab by mouth two (2) times a day. Qty: 60 Tab, Refills: 0         CONTINUE these medications which have NOT CHANGED    Details   LORazepam (ATIVAN) 1 mg tablet Take 1 mg by mouth every four (4) hours as needed for Anxiety. metoprolol tartrate (LOPRESSOR) 25 mg tablet Take 1 Tab by mouth two (2) times a day. Qty: 60 Tab, Refills: 0    Associated Diagnoses: Essential hypertension      chlorthalidone (HYGROTEN) 25 mg tablet Take 1 Tab by mouth daily. Qty: 30 Tab, Refills: 0    Associated Diagnoses: Essential hypertension      simvastatin (ZOCOR) 20 mg tablet Take 1 Tab by mouth nightly. Qty: 30 Tab, Refills: 0    Associated Diagnoses: Mixed hyperlipidemia      PARoxetine (PAXIL) 20 mg tablet Take 1 Tab by mouth daily. Qty: 30 Tab, Refills: 0    Associated Diagnoses: Depression with anxiety      buPROPion (WELLBUTRIN) 75 mg tablet Take 1 Tab by mouth two (2) times a day. Qty: 60 Tab, Refills: 0    Associated Diagnoses: Depression with anxiety      busPIRone (BUSPAR) 15 mg tablet Take 1 Tab by mouth two (2) times a day. Qty: 60 Tab, Refills: 0    Associated Diagnoses: Depression with anxiety      raNITIdine (ZANTAC) 150 mg tablet Take 1 Tab by mouth two (2) times a day. Qty: 60 Tab, Refills: 0    Associated Diagnoses: Gastroesophageal reflux disease without esophagitis      divalproex DR (DEPAKOTE) 500 mg tablet Take 1 Tab by mouth two (2) times a day.   Qty: 60 Tab, Refills: 0    Associated Diagnoses: Mood disorder ([facility] [address]             Signed by: Jamaica Blake PA-C  1/17/2018

## 2018-01-17 NOTE — PROGRESS NOTES
Famotidine - Renal dosing by Pharmacy  Current regimen:  Zantac 150 mg PO Q 12 hr PRN dyspepsia  No results for input(s): CREA, BUN in the last 72 hours. SCr Trend:  Recent Labs      07/21/17   1522   CREA  1.48*   BUN  17   Estimated CrCl:  <50 ml/min.   Will order BMP for Tomorrow AM    Change to Famotidine 20 mg PO daily prn dyspepsia for CrCl <50 mL/min    Famotidine (PO/IV) Renal Dosing:      >50 mL/min - 20 mg Q 12hrs      <50 mL/min - 20 mg Q 24hrs                    HD - 20 mg Q 24hrs               CRRT - n/a

## 2018-01-17 NOTE — ROUTINE PROCESS
Bedside shift change report given to Mary Najera (oncoming nurse) by Geoff Maddox (offgoing nurse). Report included the following information SBAR.

## 2018-01-17 NOTE — PROGRESS NOTES
Ortho Spine Daily Progress Note    Date of Surgery:  2018      Patient: Katt Martinez   YOB: 1950  Age: 69 y.o. SUBJECTIVE:   1 Day Post-Op following ACF C5-7    The patient states moderate post op pain this morning. The paitient states that their pre-operative upper extremity symptoms are improved. The patient's post operative pain is adequately controlled. The patient rates their current level of pain as 4/10, on the VAS. Lelo Mustard The patient denies CP, SOB, N/V/D, dizziness, abdominal pain, HA. OBJECTIVE:     Vital Signs:    Temp (24hrs), Av.9 °F (36.6 °C), Min:97.4 °F (36.3 °C), Max:98.2 °F (36.8 °C)    Patient Vitals for the past 8 hrs:   BP Temp Pulse Resp SpO2   18 0626 139/86 98.1 °F (36.7 °C) 72 16 93 %   18 2346 126/81 97.7 °F (36.5 °C) 82 16 93 %           Physical Exam:  General: A&Ox3, NAD. Respiratory: Respirations are unlabored. Abdomen: S/NT  Surgical site: C,D and I.  Musculoskeletal: Calves are S/NT, Boogie /hand intrinsics/wrist extensors/biceps/triceps intact, Boogie RP 2+  Neurological:  Boogie UE's NVI    Laboratory Values:             Recent Labs      18   0618   CREA  1.44*   GLU  118*     Lab Results   Component Value Date/Time    Sodium 137 2018 06:18 AM    Potassium 4.6 2018 06:18 AM    Chloride 104 2018 06:18 AM    CO2 25 2018 06:18 AM    Glucose 118 2018 06:18 AM    BUN 19 2018 06:18 AM    Creatinine 1.44 2018 06:18 AM    Calcium 8.2 2018 06:18 AM         Hemovac Output: 10 ml in last 12 hours      PLAN:     S/P ACF C5-7 Mobilize and continue with PT BID until discharged. Hemodynamics Stable, will continue to monitor. Wound Continue dressing changes PRN. Post Operative Pain Pain Control:D/C PCA, oxycdone for pain control     DVT Prophylaxis Continue with SCD'S, Encourage Ankle Pump Exercises, Mobilize. Discharge Disposition Discharge plan:  Will focus on pain control and mobilizing with PT. Plan on home today if able to mobilize and is safe for discharge. Will continue to follow progress closely. The patient was seen and evaluated by Dr Teresa Campos who agrees with the findings and treatment plan as outlined above.          Signed By: Vu Rowan PA-C  January 17, 2018 7:43 AM

## 2018-01-17 NOTE — ROUTINE PROCESS
Primary Nurse II Sandra Mary and Celio Warner, [facility] performed a dual skin assessment on this patient Impairment noted- see wound doc flow sheet  Conrado score is 23

## 2018-04-27 ENCOUNTER — ANESTHESIA EVENT (OUTPATIENT)
Dept: ENDOSCOPY | Age: 68
End: 2018-04-27
Payer: MEDICARE

## 2018-04-27 ENCOUNTER — HOSPITAL ENCOUNTER (OUTPATIENT)
Age: 68
Setting detail: OUTPATIENT SURGERY
Discharge: HOME | End: 2018-04-27
Attending: INTERNAL MEDICINE | Admitting: INTERNAL MEDICINE
Payer: MEDICARE

## 2018-04-27 ENCOUNTER — ANESTHESIA (OUTPATIENT)
Dept: ENDOSCOPY | Age: 68
End: 2018-04-27
Payer: MEDICARE

## 2018-04-27 VITALS
SYSTOLIC BLOOD PRESSURE: 133 MMHG | WEIGHT: 229 LBS | DIASTOLIC BLOOD PRESSURE: 70 MMHG | BODY MASS INDEX: 35.94 KG/M2 | OXYGEN SATURATION: 100 % | TEMPERATURE: 97.7 F | RESPIRATION RATE: 14 BRPM | HEART RATE: 69 BPM | HEIGHT: 67 IN

## 2018-04-27 PROCEDURE — 74011000250 HC RX REV CODE- 250

## 2018-04-27 PROCEDURE — 88342 IMHCHEM/IMCYTCHM 1ST ANTB: CPT | Performed by: INTERNAL MEDICINE

## 2018-04-27 PROCEDURE — 74011250637 HC RX REV CODE- 250/637: Performed by: INTERNAL MEDICINE

## 2018-04-27 PROCEDURE — 76040000007: Performed by: INTERNAL MEDICINE

## 2018-04-27 PROCEDURE — 88305 TISSUE EXAM BY PATHOLOGIST: CPT | Performed by: INTERNAL MEDICINE

## 2018-04-27 PROCEDURE — 74011250636 HC RX REV CODE- 250/636

## 2018-04-27 PROCEDURE — 76060000032 HC ANESTHESIA 0.5 TO 1 HR: Performed by: INTERNAL MEDICINE

## 2018-04-27 PROCEDURE — 74011250636 HC RX REV CODE- 250/636: Performed by: INTERNAL MEDICINE

## 2018-04-27 PROCEDURE — 77030009426 HC FCPS BIOP ENDOSC BSC -B: Performed by: INTERNAL MEDICINE

## 2018-04-27 RX ORDER — SODIUM CHLORIDE 0.9 % (FLUSH) 0.9 %
5-10 SYRINGE (ML) INJECTION EVERY 8 HOURS
Status: DISCONTINUED | OUTPATIENT
Start: 2018-04-27 | End: 2018-04-27 | Stop reason: HOSPADM

## 2018-04-27 RX ORDER — SODIUM CHLORIDE 0.9 % (FLUSH) 0.9 %
5-10 SYRINGE (ML) INJECTION AS NEEDED
Status: DISCONTINUED | OUTPATIENT
Start: 2018-04-27 | End: 2018-04-27 | Stop reason: HOSPADM

## 2018-04-27 RX ORDER — MIDAZOLAM HYDROCHLORIDE 1 MG/ML
1-3 INJECTION, SOLUTION INTRAMUSCULAR; INTRAVENOUS
Status: DISCONTINUED | OUTPATIENT
Start: 2018-04-27 | End: 2018-04-27 | Stop reason: HOSPADM

## 2018-04-27 RX ORDER — SODIUM CHLORIDE 9 MG/ML
25 INJECTION, SOLUTION INTRAVENOUS CONTINUOUS
Status: DISCONTINUED | OUTPATIENT
Start: 2018-04-27 | End: 2018-04-27 | Stop reason: HOSPADM

## 2018-04-27 RX ORDER — EPINEPHRINE 0.1 MG/ML
1 INJECTION INTRACARDIAC; INTRAVENOUS
Status: DISCONTINUED | OUTPATIENT
Start: 2018-04-27 | End: 2018-04-27 | Stop reason: HOSPADM

## 2018-04-27 RX ORDER — DEXTROMETHORPHAN/PSEUDOEPHED 2.5-7.5/.8
1.2 DROPS ORAL
Status: DISCONTINUED | OUTPATIENT
Start: 2018-04-27 | End: 2018-04-27 | Stop reason: HOSPADM

## 2018-04-27 RX ORDER — SUCRALFATE 1 G/10ML
1 SUSPENSION ORAL 4 TIMES DAILY
Qty: 500 ML | Refills: 1 | Status: SHIPPED | OUTPATIENT
Start: 2018-04-27 | End: 2018-05-07

## 2018-04-27 RX ORDER — LIDOCAINE HYDROCHLORIDE 20 MG/ML
INJECTION, SOLUTION EPIDURAL; INFILTRATION; INTRACAUDAL; PERINEURAL AS NEEDED
Status: DISCONTINUED | OUTPATIENT
Start: 2018-04-27 | End: 2018-04-27 | Stop reason: HOSPADM

## 2018-04-27 RX ORDER — ATROPINE SULFATE 0.1 MG/ML
0.5 INJECTION INTRAVENOUS
Status: DISCONTINUED | OUTPATIENT
Start: 2018-04-27 | End: 2018-04-27 | Stop reason: HOSPADM

## 2018-04-27 RX ORDER — NALOXONE HYDROCHLORIDE 0.4 MG/ML
0.4 INJECTION, SOLUTION INTRAMUSCULAR; INTRAVENOUS; SUBCUTANEOUS
Status: DISCONTINUED | OUTPATIENT
Start: 2018-04-27 | End: 2018-04-27 | Stop reason: HOSPADM

## 2018-04-27 RX ORDER — FLUMAZENIL 0.1 MG/ML
0.2 INJECTION INTRAVENOUS
Status: DISCONTINUED | OUTPATIENT
Start: 2018-04-27 | End: 2018-04-27 | Stop reason: HOSPADM

## 2018-04-27 RX ORDER — PROPOFOL 10 MG/ML
INJECTION, EMULSION INTRAVENOUS AS NEEDED
Status: DISCONTINUED | OUTPATIENT
Start: 2018-04-27 | End: 2018-04-27 | Stop reason: HOSPADM

## 2018-04-27 RX ORDER — OMEPRAZOLE 40 MG/1
40 CAPSULE, DELAYED RELEASE ORAL DAILY
Qty: 30 CAP | Refills: 3 | Status: SHIPPED | OUTPATIENT
Start: 2018-04-27 | End: 2018-05-27

## 2018-04-27 RX ADMIN — PROPOFOL 30 MG: 10 INJECTION, EMULSION INTRAVENOUS at 13:15

## 2018-04-27 RX ADMIN — PROPOFOL 30 MG: 10 INJECTION, EMULSION INTRAVENOUS at 13:19

## 2018-04-27 RX ADMIN — PROPOFOL 30 MG: 10 INJECTION, EMULSION INTRAVENOUS at 13:12

## 2018-04-27 RX ADMIN — SIMETHICONE 80 MG: 20 SUSPENSION/ DROPS ORAL at 13:25

## 2018-04-27 RX ADMIN — PROPOFOL 20 MG: 10 INJECTION, EMULSION INTRAVENOUS at 13:23

## 2018-04-27 RX ADMIN — LIDOCAINE HYDROCHLORIDE 50 MG: 20 INJECTION, SOLUTION EPIDURAL; INFILTRATION; INTRACAUDAL; PERINEURAL at 13:05

## 2018-04-27 RX ADMIN — PROPOFOL 70 MG: 10 INJECTION, EMULSION INTRAVENOUS at 13:10

## 2018-04-27 RX ADMIN — SODIUM CHLORIDE 25 ML/HR: 900 INJECTION, SOLUTION INTRAVENOUS at 12:49

## 2018-04-27 RX ADMIN — PROPOFOL 20 MG: 10 INJECTION, EMULSION INTRAVENOUS at 13:21

## 2018-04-27 NOTE — PERIOP NOTES
Craig Favorite  1950  [id number]    Situation:  Verbal report received from: Kerry Bolton RN  Procedure: Procedure(s):  ESOPHAGOGASTRODUODENOSCOPY (EGD)  COLONOSCOPY  ESOPHAGOGASTRODUODENAL (EGD) BIOPSY    Background:    Preoperative diagnosis: reflux, screening  Postoperative diagnosis: egd- esophagitis, hiatal hernia  colon- hemorrhoids    :  Dr. Honorio Santos  Assistant(s): Endoscopy Technician-1: Ree Mater  Endoscopy RN-1: Bhargavi Carrera RN    Specimens:   ID Type Source Tests Collected by Time Destination   1 : distal esophagus biopsy Preservative Esophagus, Distal  Johanne Pearson MD 4/27/2018 1320 Pathology   2 : gastric biopsy Preservative Gastric  Johanne Pearson MD 4/27/2018 1321 Pathology     H. Pylori  no    Assessment:  Intra-procedure medications   Anesthesia gave intra-procedure sedation and medications, see anesthesia flow sheet yes    Intravenous fluids: NS@ KVO     Vital signs stable     Abdominal assessment: round and soft     Recommendation:  Discharge patient per MD order.   Family or Friend   Permission to share finding with family or friend yes

## 2018-04-27 NOTE — PROCEDURES
NAME:  Rebekah Coronado   :   1950   MRN:   [id number]     Date/Time:  2018 1:06 PM    Colonoscopy Operative Report    Procedure Type:  Colonoscopy -- screening    Indications: screening colonoscopy  Pre-operative Diagnosis: see indication above  Post-operative Diagnosis:  See findings below  :  Chris Garcia MD  Referring Provider: -Sascha Meyer DO    Exam:  Airway: clear, no airway problems anticipated  Heart: RRR, without gallops or rubs  Lungs: clear bilaterally without wheezes, crackles, or rhonchi  Abdomen: soft, nontender, nondistended, bowel sounds present  Mental Status: awake, alert and oriented to person, place and time    Sedation:  MAC anesthesia Propofol  Procedure Details:  After informed consent was obtained with all risks and benefits of procedure explained and preoperative exam completed, the patient was taken to the endoscopy suite and placed in the left lateral decubitus position. Upon sequential sedation as per above, a digital rectal exam was performed demonstrating internal and external hemorrhoids. The Olympus videocolonoscope  was inserted in the rectum and carefully advanced to the cecum, which was identified by the ileocecal valve and appendiceal orifice. The quality of preparation was fair. The colonoscope was slowly withdrawn with careful evaluation between folds. Retroflexion in the rectum was completed demonstrating internal and external hemorrhoids. Findings:   ANUS: Anal exam reveals no masses but hemorrhoids, sphincter tone is normal.   RECTUM: Rectal exam reveals no masses. Internal and external hemorrhoids. SIGMOID COLON: The mucosa is normal with good vascular pattern and without ulcers, diverticula, and polyps. DESCENDING COLON: The mucosa is normal with good vascular pattern and without ulcers, diverticula, and polyps. TRANSVERSE COLON: The mucosa is normal with good vascular pattern and without ulcers, diverticula, and polyps.    ASCENDING COLON: The mucosa is normal with good vascular pattern and without ulcers, diverticula, and polyps. CECUM: The appendiceal orifice appears normal. The ileocecal valve appears normal.   TERMINAL ILEUM: The terminal ileum was not entered. Specimen Removed:  none  Complications:  None. EBL:     None. Impression:  -- internal and external hemorrhoids  -- otherwise, normal colonoscopy    Recommendations:   -- high fiber diet  -- repeat colonoscopy in 10 years     Discharge Disposition:  Home in the company of a  when able to ambulate.       Franklyn Vanessa MD

## 2018-04-27 NOTE — H&P
Date of Surgery Update:  Emili Rojas was seen and examined. History and physical has been reviewed. The patient has been examined.  There have been no significant clinical changes since the completion of the originally dated History and Physical.    Signed By: Roseanne Perez MD     April 27, 2018 1:06 PM

## 2018-04-27 NOTE — PERIOP NOTES
Anesthesia reports 200mg Propofol, 50mg Lidocaine and 700mL NS given during procedure. Received report from anesthesia staff on vital signs and status of patient.

## 2018-04-27 NOTE — IP AVS SNAPSHOT
[facility] 
[phone] Patient: Jake Terrell MRN: [id number] [id number] About your hospitalization You were admitted on:  April 27, 2018 You last received care in the:  [facility] You were discharged on:  April 27, 2018 Why you were hospitalized Your primary diagnosis was:  Not on File Follow-up Information Follow up With Details Comments Contact Info Jg Gonzalez DO   [address] [facility] 
[phone] Discharge Orders None A check ryan indicates which time of day the medication should be taken. My Medications START taking these medications Instructions Each Dose to Equal  
 Morning Noon Evening Bedtime  
 omeprazole 40 mg capsule Commonly known as:  PRILOSEC Your last dose was: Your next dose is: Take 1 Cap by mouth daily for 30 days. 40 mg  
    
   
   
   
  
 sucralfate 100 mg/mL suspension Commonly known as:  Noemi  Your last dose was: Your next dose is: Take 10 mL by mouth four (4) times daily for 10 days. 1 g CONTINUE taking these medications Instructions Each Dose to Equal  
 Morning Noon Evening Bedtime buPROPion 75 mg tablet Commonly known as:  [facility] Your last dose was: Your next dose is: Take 1 Tab by mouth two (2) times a day. 75 mg  
    
   
   
   
  
 busPIRone 15 mg tablet Commonly known as:  BUSPAR Your last dose was: Your next dose is: Take 1 Tab by mouth two (2) times a day. 15 mg  
    
   
   
   
  
 chlorthalidone 25 mg tablet Commonly known as:  [place] Ma Your last dose was: Your next dose is: Take 1 Tab by mouth daily. 25 mg  
    
   
   
   
  
 divalproex  mg tablet Commonly known as:  DEPAKOTE  
   
 Your last dose was: Your next dose is: Take 500 mg by mouth three (3) times daily. 500 mg LORazepam 1 mg tablet Commonly known as:  ATIVAN Your last dose was: Your next dose is: Take 1 mg by mouth every four (4) hours as needed for Anxiety. 1 mg  
    
   
   
   
  
 metoprolol tartrate 25 mg tablet Commonly known as:  LOPRESSOR Your last dose was: Your next dose is: Take 1 Tab by mouth two (2) times a day. 25 mg PARoxetine 20 mg tablet Commonly known as:  PAXIL Your last dose was: Your next dose is: Take 1 Tab by mouth daily. 20 mg  
    
   
   
   
  
 senna-docusate 8.6-50 mg per tablet Commonly known as:  Sulema Fresh Your last dose was: Your next dose is: Take 1 Tab by mouth two (2) times a day. 1 Tab  
    
   
   
   
  
 simvastatin 20 mg tablet Commonly known as:  ZOCOR Your last dose was: Your next dose is: Take 1 Tab by mouth nightly. 20 mg  
    
   
   
   
  
  
STOP taking these medications   
 raNITIdine 150 mg tablet Commonly known as:  ZANTAC Where to Get Your Medications Information on where to get these meds will be given to you by the nurse or doctor. ! Ask your nurse or doctor about these medications  
  omeprazole 40 mg capsule  
 sucralfate 100 mg/mL suspension Discharge Instructions Bree Hogan 
[id number] 1950 EGD and COLONOSCOPY DISCHARGE INSTRUCTIONS Discomfort: 
Sore throat- throat lozenges or warm salt water gargle 
redness at IV site- apply warm compress to area; if redness or soreness persist- contact your physician Gaseous discomfort- walking, belching will help relieve any discomfort You may not operate a vehicle for 12 hours You may not engage in an occupation involving machinery or appliances for rest of today You may not drink alcoholic beverages for at least 12 hours Avoid making any critical decisions for at least 24 hour DIET You may have minimal sips at this time-- do not eat or drink for two hours. You may eat and drink after you wake up You may resume your regular diet  however -  remember your colon is empty and a heavy meal will produce gas. Avoid these foods:  vegetables, fried / greasy foods, carbonated drinks MEDICATIONS: 
See attached ACTIVITY You may resume your normal daily activities until tomorrow AM; 
Spend the remainder of the day resting -  avoid any strenuous activity. CALL M.D. ANY SIGN OF Increasing pain, nausea, vomiting Abdominal distension (swelling) New increased bleeding (oral or rectal) Fever (chills) Pain in chest area Bloody discharge from nose or mouth Shortness of breath IMPRESSION: 
 
EGD: 
-- bad esophagitis or damage in the lower esophagus with some ulcers, this could definitely be causing nausea, and likely at least some of the pain too 
-- you have a hiatal hernia too, which makes you far more prone to esophagitis and reflux 
-- the stomach was minimally red, and we took a few biopsy samples to make sure no bacteria or concerns! COLON: 
-- no polyps or major concerns! -- we saw some hemorrhoids, which are very common, and these are swollen veins at the anal canal or end of the rectum, which can bulge with constipation and straining or frequent bowel movements. Sometimes they cause bleeding, burning, pressure, or even pain. A diet high in fiber helps, but using a daily fiber supplement (like 2 teaspoons of psyllium husk powder or metamucil) can help treat these. Follow-up Instructions: 
 Call Dr. Esther Sanchez for the results of procedure / biopsy in 7-10 days Telephone # [phone] Appointment in 3-4 weeks Repeat upper colonoscopy in 10 years Sintia Middleton MD 
 
 
 FitVia Activation Thank you for requesting access to FitVia. Please follow the instructions below to securely access and download your online medical record. FitVia allows you to send messages to your doctor, view your test results, renew your prescriptions, schedule appointments, and more. How Do I Sign Up? 1. In your internet browser, go to [email or web address] 
2. Click on the First Time User? Click Here link in the Sign In box. You will be redirect to the New Member Sign Up page. 3. Enter your FitVia Access Code exactly as it appears below. You will not need to use this code after youve completed the sign-up process. If you do not sign up before the expiration date, you must request a new code. FitVia Access Code: [email or web address] Expires: 2018  5:50 AM (This is the date your FitVia access code will ) 4. Enter the last four digits of your Social Security Number (xxxx) and Date of Birth (mm/dd/yyyy) as indicated and click Submit. You will be taken to the next sign-up page. 5. Create a FitVia ID. This will be your FitVia login ID and cannot be changed, so think of one that is secure and easy to remember. 6. Create a FitVia password. You can change your password at any time. 7. Enter your Password Reset Question and Answer. This can be used at a later time if you forget your password. 8. Enter your e-mail address. You will receive e-mail notification when new information is available in [address] 9. Click Sign Up. You can now view and download portions of your medical record. 10. Click the Download Summary menu link to download a portable copy of your medical information. Additional Information If you have questions, please visit the Frequently Asked Questions section of the FitVia website at [email or web address] Remember, FitVia is NOT to be used for urgent needs. For medical emergencies, dial 911. Introducing [facility]! [facility] introduces PPDai patient portal. Now you can access parts of your medical record, email your doctor's office, and request medication refills online. 1. In your internet browser, go to [email or web address] 2. Click on the First Time User? Click Here link in the Sign In box. You will see the New Member Sign Up page. 3. Enter your PPDai Access Code exactly as it appears below. You will not need to use this code after youve completed the sign-up process. If you do not sign up before the expiration date, you must request a new code. · PPDai Access Code: [email or web address] Expires: 7/26/2018  5:50 AM 
 
4. Enter the last four digits of your Social Security Number (xxxx) and Date of Birth (mm/dd/yyyy) as indicated and click Submit. You will be taken to the next sign-up page. 5. Create a PPDai ID. This will be your PPDai login ID and cannot be changed, so think of one that is secure and easy to remember. 6. Create a PPDai password. You can change your password at any time. 7. Enter your Password Reset Question and Answer. This can be used at a later time if you forget your password. 8. Enter your e-mail address. You will receive e-mail notification when new information is available in [address] 9. Click Sign Up. You can now view and download portions of your medical record. 10. Click the Download Summary menu link to download a portable copy of your medical information. If you have questions, please visit the Frequently Asked Questions section of the PPDai website. Remember, PPDai is NOT to be used for urgent needs. For medical emergencies, dial 911. Now available from your iPhone and Android! Introducing Eliel Hampton As a [facility] patient, I wanted to make you aware of our electronic visit tool called Eliel Hampton. [facility] 24/7 allows you to connect within minutes with a medical provider 24 hours a day, seven days a week via a mobile device or tablet or logging into a secure website from your computer. You can access bead Button from anywhere in the [place]. A virtual visit might be right for you when you have a simple condition and feel like you just dont want to get out of bed, or cant get away from work for an appointment, when your regular [employer] provider is not available (evenings, weekends or holidays), or when youre out of town and need minor care. Electronic visits cost only $49 and if the [employer] 24/7 provider determines a prescription is needed to treat your condition, one can be electronically transmitted to a nearby pharmacy*. Please take a moment to enroll today if you have not already done so. The enrollment process is free and takes just a few minutes. To enroll, please download the [employer] 24/7 robert to your tablet or phone, or visit [email or web address] to enroll on your computer. And, as an [address] [place] [address] patient with a [employer] account, the results of your visits will be scanned into your electronic medical record and your primary care provider will be able to view the scanned results. We urge you to continue to see your regular [employer] provider for your ongoing medical care. And while your primary care provider may not be the one available when you seek a Eliel ClearFlowselinfin virtual visit, the peace of mind you get from getting a real diagnosis real time can be priceless. For more information on bead Button, view our Frequently Asked Questions (FAQs) at [email or web address]. Sincerely, 
 
Lisa Porter MD 
Chief Medical Officer Earl [facility]oyd *:  certain medications cannot be prescribed via [facility] Oncologyselinfin Providers Seen During Your Hospitalization Provider Specialty Primary office phone Rosi Malone MD Gastroenterology [phone] Your Primary Care Physician (PCP) Primary Care Physician Office Phone Office Fax Ermias Aponte [phone] You are allergic to the following Allergen Reactions Penicillins Rash Recent Documentation Height Weight BMI OB Status Smoking Status 1.702 m 103.9 kg 35.87 kg/m2 Postmenopausal Never Smoker Emergency Contacts Name Discharge Info Relation Home Work Mobile Sonido Bass DISCHARGE CAREGIVER [3] Spouse [3]   [phone] Patient Belongings The following personal items are in your possession at time of discharge: 
  Dental Appliances: None  Visual Aid: None Please provide this summary of care documentation to your next provider. Signatures-by signing, you are acknowledging that this After Visit Summary has been reviewed with you and you have received a copy. Patient Signature:  ____________________________________________________________ Date:  ____________________________________________________________  
  
[facility] Provider Signature:  ____________________________________________________________ Date:  ____________________________________________________________

## 2018-04-27 NOTE — ANESTHESIA POSTPROCEDURE EVALUATION
Post-Anesthesia Evaluation and Assessment    Patient: Marii Vora MRN: [id number]  SSN: [id number]    YOB: 1950  Age: 77 y.o. Sex: female       Cardiovascular Function/Vital Signs  Visit Vitals    /73    Pulse 73    Temp 36.8 °C (98.2 °F)    Resp 21    Ht 5' 7\" (1.702 m)    Wt 103.9 kg (229 lb)    SpO2 94%    BMI 35.87 kg/m2       Patient is status post general, total IV anesthesia anesthesia for Procedure(s):  ESOPHAGOGASTRODUODENOSCOPY (EGD)  COLONOSCOPY  ESOPHAGOGASTRODUODENAL (EGD) BIOPSY. Nausea/Vomiting: None    Postoperative hydration reviewed and adequate. Pain:  Pain Scale 1: Numeric (0 - 10) (04/27/18 1244)  Pain Intensity 1: 0 (04/27/18 1244)   Managed    Neurological Status: At baseline    Mental Status and Level of Consciousness: Arousable    Pulmonary Status:   O2 Device: CO2 nasal cannula (04/27/18 1336)   Adequate oxygenation and airway patent    Complications related to anesthesia: None    Post-anesthesia assessment completed.  No concerns    Signed By: Raj Handy MD     April 27, 2018

## 2018-04-27 NOTE — ANESTHESIA PREPROCEDURE EVALUATION
Anesthetic History   No history of anesthetic complications            Review of Systems / Medical History  Patient summary reviewed, nursing notes reviewed and pertinent labs reviewed    Pulmonary  Within defined limits                 Neuro/Psych         Psychiatric history    Comments: Depression  Cervical Stenosis  Thoracic HNP  S/P Lumbar Fusion Cardiovascular    Hypertension          Hyperlipidemia    Exercise tolerance: <4 METS     GI/Hepatic/Renal     GERD: poorly controlled    Renal disease: CRI      Comments: CRI, Stage III Endo/Other        Arthritis     Other Findings            Physical Exam    Airway  Mallampati: III  TM Distance: 4 - 6 cm  Neck ROM: normal range of motion   Mouth opening: Normal     Cardiovascular  Regular rate and rhythm,  S1 and S2 normal,  no murmur, click, rub, or gallop             Dental  No notable dental hx       Pulmonary  Breath sounds clear to auscultation               Abdominal  GI exam deferred       Other Findings            Anesthetic Plan    ASA: 3  Anesthesia type: general and total IV anesthesia          Induction: Intravenous  Anesthetic plan and risks discussed with: Patient

## 2018-04-27 NOTE — DISCHARGE INSTRUCTIONS
Camille Neri  [id number]  1950    EGD and COLONOSCOPY DISCHARGE INSTRUCTIONS  Discomfort:  Sore throat- throat lozenges or warm salt water gargle  redness at IV site- apply warm compress to area; if redness or soreness persist- contact your physician  Gaseous discomfort- walking, belching will help relieve any discomfort  You may not operate a vehicle for 12 hours  You may not engage in an occupation involving machinery or appliances for rest of today  You may not drink alcoholic beverages for at least 12 hours  Avoid making any critical decisions for at least 24 hour  DIET  You may have minimal sips at this time-- do not eat or drink for two hours. You may eat and drink after you wake up  You may resume your regular diet - however -  remember your colon is empty and a heavy meal will produce gas. Avoid these foods:  vegetables, fried / greasy foods, carbonated drinks    MEDICATIONS:  See attached    ACTIVITY  You may resume your normal daily activities until tomorrow AM;  Spend the remainder of the day resting -  avoid any strenuous activity. CALL M.D. ANY SIGN OF   Increasing pain, nausea, vomiting  Abdominal distension (swelling)  New increased bleeding (oral or rectal)  Fever (chills)  Pain in chest area  Bloody discharge from nose or mouth  Shortness of breath    IMPRESSION:    EGD:  -- bad esophagitis or damage in the lower esophagus with some ulcers, this could definitely be causing nausea, and likely at least some of the pain too  -- you have a hiatal hernia too, which makes you far more prone to esophagitis and reflux  -- the stomach was minimally red, and we took a few biopsy samples to make sure no bacteria or concerns! COLON:  -- no polyps or major concerns! -- we saw some hemorrhoids, which are very common, and these are swollen veins at the anal canal or end of the rectum, which can bulge with constipation and straining or frequent bowel movements.  Sometimes they cause bleeding, burning, pressure, or even pain. A diet high in fiber helps, but using a daily fiber supplement (like 2 teaspoons of psyllium husk powder or metamucil) can help treat these. Follow-up Instructions:   Call Dr. Fox Quinones for the results of procedure / biopsy in 7-10 days   Telephone # [phone]  Appointment in 3-4 weeks  Repeat upper colonoscopy in 10 years    Tomasz Morrow MD      Dialoggy Activation    Thank you for requesting access to Dialog[employer]. Please follow the instructions below to securely access and download your online medical record. Dialoggy allows you to send messages to your doctor, view your test results, renew your prescriptions, schedule appointments, and more. How Do I Sign Up? 1. In your internet browser, go to [email or web address]  2. Click on the First Time User? Click Here link in the Sign In box. You will be redirect to the New Member Sign Up page. 3. Enter your Dialoggy Access Code exactly as it appears below. You will not need to use this code after youve completed the sign-up process. If you do not sign up before the expiration date, you must request a new code. Dialoggy Access Code: [email or web address]  Expires: 2018  5:50 AM (This is the date your Dialoggy access code will )    4. Enter the last four digits of your Social Security Number (xxxx) and Date of Birth (mm/dd/yyyy) as indicated and click Submit. You will be taken to the next sign-up page. 5. Create a Dialoggy ID. This will be your Dialoggy login ID and cannot be changed, so think of one that is secure and easy to remember. 6. Create a Dialoggy password. You can change your password at any time. 7. Enter your Password Reset Question and Answer. This can be used at a later time if you forget your password. 8. Enter your e-mail address. You will receive e-mail notification when new information is available in [address] 9. Click Sign Up. You can now view and download portions of your medical record.   10. Click the Download Summary menu link to download a portable copy of your medical information. Additional Information    If you have questions, please visit the Frequently Asked Questions section of the [employer] website at [email or web address] Remember, [employer] is NOT to be used for urgent needs. For medical emergencies, dial 911.

## 2018-04-27 NOTE — PROCEDURES
NAME:  Garrett Arnold   :   1950   MRN:   [id number]     Date/Time:  2018 1:06 PM    Esophagogastroduodenoscopy (EGD) Procedure Note    Procedure: Esophagogastroduodenoscopy with biopsy    Indication: nausea  Pre-operative Diagnosis: see indication above  Post-operative Diagnosis: see findings below  :  Diogo Marin MD  Referring Provider:   -Estrellita Escalera DO    Exam:  Airway: clear, no airway problems anticipated  Heart: RRR, without gallops or rubs  Lungs: clear bilaterally without wheezes, crackles, or rhonchi  Abdomen: soft, nontender, nondistended, bowel sounds present  Mental Status: awake, alert and oriented to person, place and time     Anethesia/Sedation:  MAC anesthesia Propofol  Procedure Details   After informed consent was obtained for the procedure, with all risks and benefits of procedure explained the patient was taken to the endoscopy suite and placed in the left lateral decubitus position. Following sequential administration of sedation as per above, the [id number] gastroscope was inserted into the mouth and advanced under direct vision to second portion of the duodenum. A careful inspection was made as the gastroscope was withdrawn, including a retroflexed view of the proximal stomach; findings and interventions are described below. Findings:   OROPHARYNX: Cords and pyriform recesses normal.   ESOPHAGUS:   -- distal esophagus with ulcerative esophagitis and linear ulcers with friability extending for 5 cm proximal to Z-line  -- biopsies obtained  STOMACH:   -- medium-sized hiatal hernia  -- mild gastritis, biopsied for possible H.pylori  DUODENUM: The bulb and second portions are normal.    Therapies:  biopsy of stomach body, antrum  biopsy of distal esophagus    Specimens: distal esophagus, gastric    EBL:  None. Complications:   None; patient tolerated the procedure well.            Impression:  -- mild gastritis, biopsied  -- erosive esophagitis with ulcers, the likely source of nausea, biopsied  -- medium-sized hiatal hernia    Recommendations:  -- PPI BID  -- will add carfate QID  -- proceed to colonoscopy    Discharge disposition:  Home in the company of  when able to ambulate    Juanita Saldaña MD

## 2018-05-19 ENCOUNTER — HOSPITAL ENCOUNTER (EMERGENCY)
Age: 68
Discharge: HOME | End: 2018-05-19
Attending: EMERGENCY MEDICINE
Payer: MEDICARE

## 2018-05-19 ENCOUNTER — APPOINTMENT (OUTPATIENT)
Dept: CT IMAGING | Age: 68
End: 2018-05-19
Attending: EMERGENCY MEDICINE
Payer: MEDICARE

## 2018-05-19 VITALS
OXYGEN SATURATION: 92 % | HEART RATE: 91 BPM | SYSTOLIC BLOOD PRESSURE: 149 MMHG | HEIGHT: 67 IN | DIASTOLIC BLOOD PRESSURE: 77 MMHG | TEMPERATURE: 97.9 F | RESPIRATION RATE: 18 BRPM

## 2018-05-19 DIAGNOSIS — R10.31 ABDOMINAL PAIN, RIGHT LOWER QUADRANT: ICD-10-CM

## 2018-05-19 DIAGNOSIS — R10.11 ABDOMINAL PAIN, RIGHT UPPER QUADRANT: Primary | ICD-10-CM

## 2018-05-19 LAB
ALBUMIN SERPL-MCNC: 3.6 G/DL (ref 3.5–5)
ALBUMIN/GLOB SERPL: 0.9 {RATIO} (ref 1.1–2.2)
ALP SERPL-CCNC: 234 U/L (ref 45–117)
ALT SERPL-CCNC: 145 U/L (ref 12–78)
ANION GAP SERPL CALC-SCNC: 10 MMOL/L (ref 5–15)
AST SERPL-CCNC: 50 U/L (ref 15–37)
BASOPHILS # BLD: 0 K/UL (ref 0–0.1)
BASOPHILS NFR BLD: 0 % (ref 0–1)
BILIRUB SERPL-MCNC: 0.9 MG/DL (ref 0.2–1)
BUN SERPL-MCNC: 21 MG/DL (ref 6–20)
BUN/CREAT SERPL: 16 (ref 12–20)
CALCIUM SERPL-MCNC: 9.2 MG/DL (ref 8.5–10.1)
CHLORIDE SERPL-SCNC: 100 MMOL/L (ref 97–108)
CO2 SERPL-SCNC: 22 MMOL/L (ref 21–32)
CREAT SERPL-MCNC: 1.29 MG/DL (ref 0.55–1.02)
DIFFERENTIAL METHOD BLD: ABNORMAL
EOSINOPHIL # BLD: 0.1 K/UL (ref 0–0.4)
EOSINOPHIL NFR BLD: 1 % (ref 0–7)
ERYTHROCYTE [DISTWIDTH] IN BLOOD BY AUTOMATED COUNT: 13.2 % (ref 11.5–14.5)
GLOBULIN SER CALC-MCNC: 4.1 G/DL (ref 2–4)
GLUCOSE SERPL-MCNC: 150 MG/DL (ref 65–100)
HCT VFR BLD AUTO: 49.1 % (ref 35–47)
HGB BLD-MCNC: 17 G/DL (ref 11.5–16)
IMM GRANULOCYTES # BLD: 0 K/UL (ref 0–0.04)
IMM GRANULOCYTES NFR BLD AUTO: 0 % (ref 0–0.5)
LIPASE SERPL-CCNC: 92 U/L (ref 73–393)
LYMPHOCYTES # BLD: 1.9 K/UL (ref 0.8–3.5)
LYMPHOCYTES NFR BLD: 18 % (ref 12–49)
MCH RBC QN AUTO: 29.9 PG (ref 26–34)
MCHC RBC AUTO-ENTMCNC: 34.6 G/DL (ref 30–36.5)
MCV RBC AUTO: 86.4 FL (ref 80–99)
MONOCYTES # BLD: 0.8 K/UL (ref 0–1)
MONOCYTES NFR BLD: 8 % (ref 5–13)
NEUTS SEG # BLD: 7.6 K/UL (ref 1.8–8)
NEUTS SEG NFR BLD: 73 % (ref 32–75)
NRBC # BLD: 0 K/UL (ref 0–0.01)
NRBC BLD-RTO: 0 PER 100 WBC
PLATELET # BLD AUTO: 279 K/UL (ref 150–400)
PMV BLD AUTO: 9.6 FL (ref 8.9–12.9)
POTASSIUM SERPL-SCNC: 3.9 MMOL/L (ref 3.5–5.1)
PROT SERPL-MCNC: 7.7 G/DL (ref 6.4–8.2)
RBC # BLD AUTO: 5.68 M/UL (ref 3.8–5.2)
SODIUM SERPL-SCNC: 132 MMOL/L (ref 136–145)
WBC # BLD AUTO: 10.5 K/UL (ref 3.6–11)

## 2018-05-19 PROCEDURE — 80053 COMPREHEN METABOLIC PANEL: CPT | Performed by: EMERGENCY MEDICINE

## 2018-05-19 PROCEDURE — 99283 EMERGENCY DEPT VISIT LOW MDM: CPT

## 2018-05-19 PROCEDURE — 83690 ASSAY OF LIPASE: CPT | Performed by: EMERGENCY MEDICINE

## 2018-05-19 PROCEDURE — 36415 COLL VENOUS BLD VENIPUNCTURE: CPT | Performed by: EMERGENCY MEDICINE

## 2018-05-19 PROCEDURE — 96374 THER/PROPH/DIAG INJ IV PUSH: CPT

## 2018-05-19 PROCEDURE — 85025 COMPLETE CBC W/AUTO DIFF WBC: CPT | Performed by: EMERGENCY MEDICINE

## 2018-05-19 PROCEDURE — 96375 TX/PRO/DX INJ NEW DRUG ADDON: CPT

## 2018-05-19 PROCEDURE — 74011250636 HC RX REV CODE- 250/636: Performed by: EMERGENCY MEDICINE

## 2018-05-19 PROCEDURE — 96376 TX/PRO/DX INJ SAME DRUG ADON: CPT

## 2018-05-19 RX ORDER — OXYCODONE AND ACETAMINOPHEN 5; 325 MG/1; MG/1
1 TABLET ORAL
Qty: 15 TAB | Refills: 0 | Status: ON HOLD | OUTPATIENT
Start: 2018-05-19 | End: 2019-10-30

## 2018-05-19 RX ORDER — ONDANSETRON 2 MG/ML
4 INJECTION INTRAMUSCULAR; INTRAVENOUS
Status: COMPLETED | OUTPATIENT
Start: 2018-05-19 | End: 2018-05-19

## 2018-05-19 RX ORDER — FENTANYL CITRATE 50 UG/ML
50 INJECTION, SOLUTION INTRAMUSCULAR; INTRAVENOUS
Status: COMPLETED | OUTPATIENT
Start: 2018-05-19 | End: 2018-05-19

## 2018-05-19 RX ORDER — HYDROMORPHONE HYDROCHLORIDE 2 MG/ML
0.5 INJECTION, SOLUTION INTRAMUSCULAR; INTRAVENOUS; SUBCUTANEOUS
Status: DISCONTINUED | OUTPATIENT
Start: 2018-05-19 | End: 2018-05-19

## 2018-05-19 RX ADMIN — FENTANYL CITRATE 50 MCG: 50 INJECTION, SOLUTION INTRAMUSCULAR; INTRAVENOUS at 12:31

## 2018-05-19 RX ADMIN — ONDANSETRON 4 MG: 2 INJECTION INTRAMUSCULAR; INTRAVENOUS at 12:30

## 2018-05-19 RX ADMIN — FENTANYL CITRATE 50 MCG: 50 INJECTION, SOLUTION INTRAMUSCULAR; INTRAVENOUS at 14:34

## 2018-05-19 NOTE — ED NOTES
Pt unable to tolerate lying flat for CT scan. Dr Veronique Koenig made aware.  Pt moved to core room

## 2018-05-19 NOTE — DISCHARGE INSTRUCTIONS
Abdominal Pain: Care Instructions  Your Care Instructions    Abdominal pain has many possible causes. Some aren't serious and get better on their own in a few days. Others need more testing and treatment. If your pain continues or gets worse, you need to be rechecked and may need more tests to find out what is wrong. You may need surgery to correct the problem. Don't ignore new symptoms, such as fever, nausea and vomiting, urination problems, pain that gets worse, and dizziness. These may be signs of a more serious problem. Your doctor may have recommended a follow-up visit in the next 8 to 12 hours. If you are not getting better, you may need more tests or treatment. The doctor has checked you carefully, but problems can develop later. If you notice any problems or new symptoms, get medical treatment right away. Follow-up care is a key part of your treatment and safety. Be sure to make and go to all appointments, and call your doctor if you are having problems. It's also a good idea to know your test results and keep a list of the medicines you take. How can you care for yourself at home? · Rest until you feel better. · To prevent dehydration, drink plenty of fluids, enough so that your urine is light yellow or clear like water. Choose water and other caffeine-free clear liquids until you feel better. If you have kidney, heart, or liver disease and have to limit fluids, talk with your doctor before you increase the amount of fluids you drink. · If your stomach is upset, eat mild foods, such as rice, dry toast or crackers, bananas, and applesauce. Try eating several small meals instead of two or three large ones. · Wait until 48 hours after all symptoms have gone away before you have spicy foods, alcohol, and drinks that contain caffeine. · Do not eat foods that are high in fat. · Avoid anti-inflammatory medicines such as aspirin, ibuprofen (Advil, Motrin), and naproxen (Aleve).  These can cause stomach upset. Talk to your doctor if you take daily aspirin for another health problem. When should you call for help? Call 911 anytime you think you may need emergency care. For example, call if:  ? · You passed out (lost consciousness). ? · You pass maroon or very bloody stools. ? · You vomit blood or what looks like coffee grounds. ? · You have new, severe belly pain. ?Call your doctor now or seek immediate medical care if:  ? · Your pain gets worse, especially if it becomes focused in one area of your belly. ? · You have a new or higher fever. ? · Your stools are black and look like tar, or they have streaks of blood. ? · You have unexpected vaginal bleeding. ? · You have symptoms of a urinary tract infection. These may include:  ¨ Pain when you urinate. ¨ Urinating more often than usual.  ¨ Blood in your urine. ? · You are dizzy or lightheaded, or you feel like you may faint. ? Watch closely for changes in your health, and be sure to contact your doctor if:  ? · You are not getting better after 1 day (24 hours). Where can you learn more? Go to http://nayana-jerson.info/. Enter A403 in the search box to learn more about \"Abdominal Pain: Care Instructions. \"  Current as of: March 20, 2017  Content Version: 11.4  © 1237-0337 [employer]. Care instructions adapted under license by [employer] (which disclaims liability or warranty for this information). If you have questions about a medical condition or this instruction, always ask your healthcare professional. [place] [address] any warranty or liability for your use of this information.

## 2018-05-19 NOTE — ED TRIAGE NOTES
Pt c/o right sided abd pain for past year. Pt had colonscopy and endoscopy end of April.  +n/v denies diarrhea

## 2018-05-19 NOTE — ROUTINE PROCESS
Reviewed discharge instructions with the patient and the patient's . Each verbalized understanding and denied having any further questions. Script given.

## 2018-05-19 NOTE — ED NOTES
11:33 AM  I have evaluated the patient as the Provider in Triage. I have reviewed Her vital signs and the triage nurse assessment. I have talked with the patient and any available family and advised that I am the provider in triage and have ordered the appropriate study to initiate their work up based on the clinical presentation during my assessment. I have advised that the patient will be accommodated in the Main ED as soon as possible. I have also requested to contact the triage nurse or myself immediately if the patient experiences any changes in their condition during this brief waiting period. Clarence Orosco, ADOLFO    Pt reports right mid to lower abdominal pain for over 1 year; pain increased last night accompanied by nauseas, non bloody vomiting and diarrhea.  Pt reports that she is under the care of Dr. Tricia Cruz (gastroenterology) and had both an endoscopy and colonscopy on 4/27/18

## 2018-05-19 NOTE — ED PROVIDER NOTES
HPI Comments: 77 y.o. female with past medical history significant for depression, hypertension, hypercholesterolemia, chronic kidney disease, thoracic disc herniation, GERD, appendectomy, and cholecystectomy who presents from home with chief complaint of abdominal pain. Patient states onset of moderate right-sided abdominal pain \"for a while\" that progressively worsened last night. She notes the pain is aggravated with any sort of stretching. She mentions accompanying nausea and 3-5 episodes of non-bloody vomiting since last night. Patient notes she first experienced the pain 2 years ago and reports intermittent pain within the last few weeks. Patient mentions she occasionally uses a wheelchair in her apartment to ambulate. Patient notes hx of appendectomy, cholecystectomy, and abdominal surgery for spine issues. She denies any dysuria or other urine issues at this time. There are no other acute medical concerns at this time. Old Chart Review: Per note, patient had a EGD by Dr. Francine Sullivan on 04/27/18 that revealed internal and external hemorrhoids. She also had a medium sized hernia, erosive esophagitis with ulcers, and mild gastritis. Social hx: Tobacco Use: No, Alcohol Use: No (rarely), Drug Use: No    PCP: Laith Arauz DO  Gastroenterologist: Hair Raya MD    Note written by Izabella Gupta, as dictated by Lauryn Fiore MD 12:15 PM    The history is provided by the patient and medical records.         Past Medical History:   Diagnosis Date    Adverse effect of anesthesia     hard to wake up    Chronic kidney disease     Depression     GERD (gastroesophageal reflux disease)     Hypercholesterolemia     Hypertension     Thoracic disc herniation        Past Surgical History:   Procedure Laterality Date    COLONOSCOPY N/A 4/27/2018    [facility] ENDOSCOPY    HX APPENDECTOMY      HX CHOLECYSTECTOMY      HX LUMBAR FUSION      HX ORTHOPAEDIC      HX TONSIL AND ADENOIDECTOMY      OH COLON CA SCRN NOT  [address]  4/27/2018         UPPER GI ENDOSCOPY,BIOPSY  4/27/2018              Family History:   Problem Relation Age of Onset    No Known Problems Mother     Heart Attack Father     Heart Disease Maternal Aunt     Heart Attack Paternal Uncle     Cancer Maternal Aunt     Heart Disease Paternal Aunt        Social History     Social History    Marital status:      Spouse name: N/A    Number of children: N/A    Years of education: N/A     Occupational History    Not on file. Social History Main Topics    Smoking status: Never Smoker    Smokeless tobacco: Never Used    Alcohol use No      Comment: rarely    Drug use: No    Sexual activity: Not Currently     Other Topics Concern    Not on file     Social History Narrative         ALLERGIES: Penicillins    Review of Systems   Constitutional: Negative for appetite change, chills and fever. HENT: Negative for rhinorrhea, sore throat and trouble swallowing. Eyes: Negative for photophobia. Respiratory: Negative for cough and shortness of breath. Cardiovascular: Negative for chest pain and palpitations. Gastrointestinal: Positive for abdominal pain, nausea and vomiting. Genitourinary: Negative for dysuria, frequency and hematuria. Musculoskeletal: Negative for arthralgias. Neurological: Negative for dizziness, syncope and weakness. Psychiatric/Behavioral: Negative for behavioral problems. The patient is not nervous/anxious. All other systems reviewed and are negative. Vitals:    05/19/18 1135   BP: (!) 135/92   Pulse: (!) 109   Resp: 18   Temp: 98.3 °F (36.8 °C)   SpO2: 92%   Height: 5' 7\" (1.702 m)            Physical Exam   Constitutional: She appears well-developed and well-nourished. HENT:   Head: Normocephalic and atraumatic. Mouth/Throat: Oropharynx is clear and moist.   Eyes: EOM are normal. Pupils are equal, round, and reactive to light.    Neck: Normal range of motion. Neck supple. Cardiovascular: Normal rate, regular rhythm, normal heart sounds and intact distal pulses. Exam reveals no gallop and no friction rub. No murmur heard. Pulmonary/Chest: Effort normal. No respiratory distress. She has no wheezes. She has no rales. Abdominal: Soft. There is tenderness. There is no rebound. RUQ and RLQ tenderness upon palpation. Musculoskeletal: Normal range of motion. She exhibits no tenderness. Neurological: She is alert. No cranial nerve deficit. Motor; symmetric   Skin: No erythema. Psychiatric: She has a normal mood and affect. Her behavior is normal.   Nursing note and vitals reviewed.      Note written by Izabella Hammonds, as dictated by Eleno Loo MD 12:15 PM    [facility]      ED Course       Procedures

## 2018-11-06 ENCOUNTER — HOSPITAL ENCOUNTER (OUTPATIENT)
Dept: ULTRASOUND IMAGING | Age: 68
Discharge: HOME | End: 2018-11-06
Attending: INTERNAL MEDICINE | Admitting: INTERNAL MEDICINE
Payer: MEDICARE

## 2018-11-06 DIAGNOSIS — R10.31 RLQ ABDOMINAL PAIN: ICD-10-CM

## 2018-11-06 DIAGNOSIS — K44.9 HIATAL HERNIA WITH GERD WITHOUT ESOPHAGITIS: ICD-10-CM

## 2018-11-06 DIAGNOSIS — R79.89 ELEVATED LFTS: ICD-10-CM

## 2018-11-06 DIAGNOSIS — K21.9 HIATAL HERNIA WITH GERD WITHOUT ESOPHAGITIS: ICD-10-CM

## 2018-11-06 PROCEDURE — 93976 VASCULAR STUDY: CPT

## 2019-10-25 ENCOUNTER — HOSPITAL ENCOUNTER (OUTPATIENT)
Dept: CT IMAGING | Age: 69
Discharge: HOME | End: 2019-10-25
Attending: INTERNAL MEDICINE
Payer: MEDICARE

## 2019-10-25 DIAGNOSIS — R10.9 STOMACH ACHE: ICD-10-CM

## 2019-10-25 LAB — CREAT BLD-MCNC: 1.2 MG/DL (ref 0.6–1.3)

## 2019-10-25 PROCEDURE — 74160 CT ABDOMEN W/CONTRAST: CPT

## 2019-10-25 PROCEDURE — 74011636320 HC RX REV CODE- 636/320: Performed by: RADIOLOGY

## 2019-10-25 PROCEDURE — 74011000258 HC RX REV CODE- 258: Performed by: RADIOLOGY

## 2019-10-25 PROCEDURE — 82565 ASSAY OF CREATININE: CPT

## 2019-10-25 RX ORDER — SODIUM CHLORIDE 0.9 % (FLUSH) 0.9 %
10 SYRINGE (ML) INJECTION
Status: COMPLETED | OUTPATIENT
Start: 2019-10-25 | End: 2019-10-25

## 2019-10-25 RX ADMIN — SODIUM CHLORIDE 100 ML: 900 INJECTION, SOLUTION INTRAVENOUS at 16:01

## 2019-10-25 RX ADMIN — Medication 10 ML: at 16:01

## 2019-10-25 RX ADMIN — IOPAMIDOL 80 ML: 755 INJECTION, SOLUTION INTRAVENOUS at 16:00

## 2019-10-30 ENCOUNTER — ANESTHESIA (OUTPATIENT)
Dept: ENDOSCOPY | Age: 69
End: 2019-10-30
Payer: MEDICARE

## 2019-10-30 ENCOUNTER — ANESTHESIA EVENT (OUTPATIENT)
Dept: ENDOSCOPY | Age: 69
End: 2019-10-30
Payer: MEDICARE

## 2019-10-30 ENCOUNTER — HOSPITAL ENCOUNTER (OUTPATIENT)
Age: 69
Setting detail: OUTPATIENT SURGERY
Discharge: HOME | End: 2019-10-30
Attending: INTERNAL MEDICINE | Admitting: INTERNAL MEDICINE
Payer: MEDICARE

## 2019-10-30 ENCOUNTER — APPOINTMENT (OUTPATIENT)
Dept: GENERAL RADIOLOGY | Age: 69
End: 2019-10-30
Attending: INTERNAL MEDICINE
Payer: MEDICARE

## 2019-10-30 VITALS
RESPIRATION RATE: 16 BRPM | DIASTOLIC BLOOD PRESSURE: 80 MMHG | HEART RATE: 67 BPM | TEMPERATURE: 97.6 F | OXYGEN SATURATION: 92 % | SYSTOLIC BLOOD PRESSURE: 132 MMHG

## 2019-10-30 PROCEDURE — 74011250636 HC RX REV CODE- 250/636: Performed by: NURSE ANESTHETIST, CERTIFIED REGISTERED

## 2019-10-30 PROCEDURE — 88112 CYTOPATH CELL ENHANCE TECH: CPT

## 2019-10-30 PROCEDURE — 77030007288 HC DEV LOK BILI BSC -A: Performed by: INTERNAL MEDICINE

## 2019-10-30 PROCEDURE — 74011636320 HC RX REV CODE- 636/320

## 2019-10-30 PROCEDURE — 77030012596 HC SPHNTOM BILI BSC -E: Performed by: INTERNAL MEDICINE

## 2019-10-30 PROCEDURE — 77030009038 HC CATH BILI STN RTVR BSC -C: Performed by: INTERNAL MEDICINE

## 2019-10-30 PROCEDURE — 77030026438 HC STYL ET INTUB CARD -A: Performed by: ANESTHESIOLOGY

## 2019-10-30 PROCEDURE — 77030008684 HC TU ET CUF COVD -B: Performed by: ANESTHESIOLOGY

## 2019-10-30 PROCEDURE — C2625 STENT, NON-COR, TEM W/DEL SY: HCPCS | Performed by: INTERNAL MEDICINE

## 2019-10-30 PROCEDURE — 74011000250 HC RX REV CODE- 250: Performed by: NURSE ANESTHETIST, CERTIFIED REGISTERED

## 2019-10-30 PROCEDURE — 77030036933 HC BRSH RX CYTO WREGD BSC -C: Performed by: INTERNAL MEDICINE

## 2019-10-30 PROCEDURE — 76040000009: Performed by: INTERNAL MEDICINE

## 2019-10-30 PROCEDURE — 76060000034 HC ANESTHESIA 1.5 TO 2 HR: Performed by: INTERNAL MEDICINE

## 2019-10-30 DEVICE — BILIARY STENT WITH NAVIFLEXTM RX DELIVERY SYSTEM
Type: IMPLANTABLE DEVICE | Site: BILE DUCT | Status: FUNCTIONAL
Brand: ADVANIX™ BILIARY

## 2019-10-30 RX ORDER — DICYCLOMINE HYDROCHLORIDE 20 MG/1
20 TABLET ORAL AS NEEDED
COMMUNITY

## 2019-10-30 RX ORDER — SODIUM CHLORIDE 0.9 % (FLUSH) 0.9 %
5-40 SYRINGE (ML) INJECTION AS NEEDED
Status: DISCONTINUED | OUTPATIENT
Start: 2019-10-30 | End: 2019-10-30 | Stop reason: HOSPADM

## 2019-10-30 RX ORDER — LIDOCAINE HYDROCHLORIDE 20 MG/ML
INJECTION, SOLUTION EPIDURAL; INFILTRATION; INTRACAUDAL; PERINEURAL AS NEEDED
Status: DISCONTINUED | OUTPATIENT
Start: 2019-10-30 | End: 2019-10-30 | Stop reason: HOSPADM

## 2019-10-30 RX ORDER — GABAPENTIN 300 MG/1
300 CAPSULE ORAL 3 TIMES DAILY
COMMUNITY

## 2019-10-30 RX ORDER — ROCURONIUM BROMIDE 10 MG/ML
INJECTION, SOLUTION INTRAVENOUS AS NEEDED
Status: DISCONTINUED | OUTPATIENT
Start: 2019-10-30 | End: 2019-10-30 | Stop reason: HOSPADM

## 2019-10-30 RX ORDER — AZELASTINE HCL 205.5 UG/1
SPRAY NASAL AS NEEDED
COMMUNITY

## 2019-10-30 RX ORDER — FLUMAZENIL 0.1 MG/ML
0.2 INJECTION INTRAVENOUS
Status: DISCONTINUED | OUTPATIENT
Start: 2019-10-30 | End: 2019-10-30 | Stop reason: HOSPADM

## 2019-10-30 RX ORDER — ONDANSETRON 2 MG/ML
INJECTION INTRAMUSCULAR; INTRAVENOUS AS NEEDED
Status: DISCONTINUED | OUTPATIENT
Start: 2019-10-30 | End: 2019-10-30 | Stop reason: HOSPADM

## 2019-10-30 RX ORDER — ATROPINE SULFATE 0.1 MG/ML
0.5 INJECTION INTRAVENOUS
Status: DISCONTINUED | OUTPATIENT
Start: 2019-10-30 | End: 2019-10-30 | Stop reason: HOSPADM

## 2019-10-30 RX ORDER — TIZANIDINE 4 MG/1
4 TABLET ORAL AS NEEDED
COMMUNITY

## 2019-10-30 RX ORDER — DEXTROMETHORPHAN/PSEUDOEPHED 2.5-7.5/.8
1.2 DROPS ORAL
Status: DISCONTINUED | OUTPATIENT
Start: 2019-10-30 | End: 2019-10-30 | Stop reason: HOSPADM

## 2019-10-30 RX ORDER — SUCCINYLCHOLINE CHLORIDE 20 MG/ML
INJECTION INTRAMUSCULAR; INTRAVENOUS AS NEEDED
Status: DISCONTINUED | OUTPATIENT
Start: 2019-10-30 | End: 2019-10-30 | Stop reason: HOSPADM

## 2019-10-30 RX ORDER — BUSPIRONE HYDROCHLORIDE 10 MG/1
10 TABLET ORAL 3 TIMES DAILY
COMMUNITY

## 2019-10-30 RX ORDER — PAROXETINE HYDROCHLORIDE 40 MG/1
40 TABLET, FILM COATED ORAL DAILY
COMMUNITY

## 2019-10-30 RX ORDER — NALOXONE HYDROCHLORIDE 0.4 MG/ML
0.4 INJECTION, SOLUTION INTRAMUSCULAR; INTRAVENOUS; SUBCUTANEOUS
Status: DISCONTINUED | OUTPATIENT
Start: 2019-10-30 | End: 2019-10-30 | Stop reason: HOSPADM

## 2019-10-30 RX ORDER — PROPOFOL 10 MG/ML
INJECTION, EMULSION INTRAVENOUS AS NEEDED
Status: DISCONTINUED | OUTPATIENT
Start: 2019-10-30 | End: 2019-10-30 | Stop reason: HOSPADM

## 2019-10-30 RX ORDER — SIMVASTATIN 40 MG/1
40 TABLET, FILM COATED ORAL
COMMUNITY

## 2019-10-30 RX ORDER — SODIUM CHLORIDE 9 MG/ML
INJECTION, SOLUTION INTRAVENOUS
Status: DISCONTINUED | OUTPATIENT
Start: 2019-10-30 | End: 2019-10-30 | Stop reason: HOSPADM

## 2019-10-30 RX ORDER — EPINEPHRINE 0.1 MG/ML
1 INJECTION INTRACARDIAC; INTRAVENOUS
Status: DISCONTINUED | OUTPATIENT
Start: 2019-10-30 | End: 2019-10-30 | Stop reason: HOSPADM

## 2019-10-30 RX ORDER — DIVALPROEX SODIUM 250 MG/1
250 TABLET, DELAYED RELEASE ORAL 3 TIMES DAILY
COMMUNITY

## 2019-10-30 RX ORDER — PHENYLEPHRINE HCL IN 0.9% NACL 0.4MG/10ML
SYRINGE (ML) INTRAVENOUS AS NEEDED
Status: DISCONTINUED | OUTPATIENT
Start: 2019-10-30 | End: 2019-10-30 | Stop reason: HOSPADM

## 2019-10-30 RX ORDER — LORAZEPAM 2 MG/1
2 TABLET ORAL AS NEEDED
COMMUNITY

## 2019-10-30 RX ORDER — SODIUM CHLORIDE 0.9 % (FLUSH) 0.9 %
5-40 SYRINGE (ML) INJECTION EVERY 8 HOURS
Status: DISCONTINUED | OUTPATIENT
Start: 2019-10-30 | End: 2019-10-30 | Stop reason: HOSPADM

## 2019-10-30 RX ADMIN — ROCURONIUM BROMIDE 10 MG: 10 SOLUTION INTRAVENOUS at 13:16

## 2019-10-30 RX ADMIN — LIDOCAINE HYDROCHLORIDE 80 MG: 20 INJECTION, SOLUTION EPIDURAL; INFILTRATION; INTRACAUDAL; PERINEURAL at 13:16

## 2019-10-30 RX ADMIN — PROPOFOL 200 MG: 10 INJECTION, EMULSION INTRAVENOUS at 13:16

## 2019-10-30 RX ADMIN — SODIUM CHLORIDE: 900 INJECTION, SOLUTION INTRAVENOUS at 13:46

## 2019-10-30 RX ADMIN — SODIUM CHLORIDE: 900 INJECTION, SOLUTION INTRAVENOUS at 12:50

## 2019-10-30 RX ADMIN — GLUCAGON HYDROCHLORIDE 0.5 MG: KIT at 13:47

## 2019-10-30 RX ADMIN — PHENYLEPHRINE HYDROCHLORIDE 40 MCG: 10 INJECTION INTRAVENOUS at 13:45

## 2019-10-30 RX ADMIN — PHENYLEPHRINE HYDROCHLORIDE 80 MCG: 10 INJECTION INTRAVENOUS at 13:43

## 2019-10-30 RX ADMIN — ONDANSETRON HYDROCHLORIDE 4 MG: 2 INJECTION, SOLUTION INTRAMUSCULAR; INTRAVENOUS at 14:27

## 2019-10-30 RX ADMIN — PHENYLEPHRINE HYDROCHLORIDE 40 MCG: 10 INJECTION INTRAVENOUS at 13:55

## 2019-10-30 RX ADMIN — GLUCAGON HYDROCHLORIDE 0.5 MG: KIT at 14:08

## 2019-10-30 RX ADMIN — PHENYLEPHRINE HYDROCHLORIDE 80 MCG: 10 INJECTION INTRAVENOUS at 13:47

## 2019-10-30 RX ADMIN — IOPAMIDOL 6 ML: 612 INJECTION, SOLUTION INTRAVENOUS at 14:26

## 2019-10-30 RX ADMIN — SUCCINYLCHOLINE CHLORIDE 180 MG: 20 INJECTION, SOLUTION INTRAMUSCULAR; INTRAVENOUS at 13:16

## 2019-10-30 NOTE — ANESTHESIA POSTPROCEDURE EVALUATION
Procedure(s):  ENDOSCOPIC RETROGRADE CHOLANGIOPANCREATOGRAPHY (ERCP)  SPHINCTEROTOMY  ENDOSCOPIC STONE EXTRACTION/BALLOON SWEEP  ENDOSCOPIC BRUSHING  BILIARY STENT PLACEMENT. general    <BSHSIANPOST>    Vitals Value Taken Time   /93 10/30/2019  2:34 PM   Temp 36.4 °C (97.6 °F) 10/30/2019  2:33 PM   Pulse 84 10/30/2019  2:38 PM   Resp 19 10/30/2019  2:38 PM   SpO2 93 % 10/30/2019  2:38 PM   Vitals shown include unvalidated device data.

## 2019-10-30 NOTE — DISCHARGE INSTRUCTIONS
[facility].  [address] [place] [address]  [phone]                     DISCHARGE INSTRUCTIONS    Sam Chambers  [id number]  1950    DISCOMFORT:  Sore throat-  warm salt water gargle  redness at IV site- apply warm compress to area; if redness or soreness persist- contact your physician  Gaseous discomfort- walking, belching will help relieve any discomfort    DIET  You may eat and drink after you leave. Clear liquid diet today ,advance as tolerated over next 3-4 days  You may resume your regular diet - however -  remember your colon is empty and a heavy meal will produce gas. Avoid these foods:  vegetables, fried / greasy foods, carbonated drinks    ACTIVITY  You may resume your normal daily activities   Spend the remainder of the day resting -  avoid any strenuous activity. You may not operate a vehicle for 12 hours  You may not engage in an occupation involving machinery or appliances for rest of today  You may not drink alcoholic beverages for at least 12 hours  Avoid making any critical decisions for at least 24 hour    CALL M.D. ANY SIGN OF   Increasing pain, nausea, vomiting  Abdominal distension (swelling)  New increased bleeding (oral or rectal)  Fever (chills)  Pain in chest area    Shortness of breath    Follow-up Instructions:   Call Dr. Heri Sparks for any questions or problems. we took a biopsy please call the office within 2 weeks to discuss your pathology results. Telephone # [phone]  ERCP in 3Months            No Aspirin for 7 days.           Follow up in office in 6-8 weeks  ENDOSCOPY FINDINGS:   Bile Duct Stricture  Ampullary Stenosis

## 2019-10-30 NOTE — PROGRESS NOTES
Received report from anesthesia staff on vital signs and status of patient.     Scope precleaned at bedside by Marbella Jordan

## 2019-10-30 NOTE — ROUTINE PROCESS
[place] 1950 
[id number] Situation: 
Verbal report received from: Sepideh Moody RN 
Procedure: Procedure(s): ENDOSCOPIC RETROGRADE CHOLANGIOPANCREATOGRAPHY (ERCP) SPHINCTEROTOMY 
ENDOSCOPIC STONE EXTRACTION/BALLOON SWEEP 
ENDOSCOPIC BRUSHING 
BILIARY STENT PLACEMENT Background: 
 
Preoperative diagnosis: CBD Stones Postoperative diagnosis: Bile Duct Stricture Ampullary Stenosis :  Dr. Bienvenido Mosher 
Assistant(s): Endoscopy Technician-1: Alexis Berumen Endoscopy RN-1: Gunner Lomax RN Specimens: * No specimens in log * H. Pylori  no Assessment: 
Intra-procedure medications Anesthesia gave intra-procedure sedation and medications, see anesthesia flow sheet yes Intravenous fluids: NS@ Bradley Keith Vital signs stable Abdominal assessment: round and soft Recommendation: 
Discharge patient per MD order. Family or Friend Permission to share finding with family or friend yes

## 2019-10-30 NOTE — ANESTHESIA PREPROCEDURE EVALUATION
Relevant Problems   No relevant active problems       Anesthetic History   No history of anesthetic complications            Review of Systems / Medical History  Patient summary reviewed, nursing notes reviewed and pertinent labs reviewed    Pulmonary  Within defined limits                 Neuro/Psych   Within defined limits           Cardiovascular  Within defined limits  Hypertension                   GI/Hepatic/Renal  Within defined limits   GERD    Renal disease       Endo/Other  Within defined limits      Obesity and arthritis     Other Findings              Physical Exam    Airway  Mallampati: II  TM Distance: > 6 cm  Neck ROM: normal range of motion   Mouth opening: Normal     Cardiovascular  Regular rate and rhythm,  S1 and S2 normal,  no murmur, click, rub, or gallop             Dental  No notable dental hx       Pulmonary  Breath sounds clear to auscultation               Abdominal  GI exam deferred       Other Findings            Anesthetic Plan    ASA: 3  Anesthesia type: general            Anesthetic plan and risks discussed with: Patient

## 2019-10-30 NOTE — PROCEDURES
118 [facility].  217 [facility] [address], [address]  [phone]                   ERCP NOTE    NAME:  Sheree Worrell   :   1950   MRN:   [id number]     Date/Time:  10/30/2019 2:18 PM    Procedure Type:   ERCPwith biliary sphincterotomy, biliary stent placement, biliary tissue sampling     Indications: common bile duct stone     Pre-operative Diagnosis: see indication above  Post-operative Diagnosis:  See findings below  : Ariana Dai MD    Staff: Endoscopy Technician-1: Dorene PALAFOX  Endoscopy RN-1: Tej Payne RN     Implants: Plastic biliary stent 10 Fr X 7 cm ([employer], [employer])    Referring Provider:    Umu Rojo MD , Willie Willams DO    Sedation:  General anesthesia    Procedure Details:  After informed consent was obtained with all risks and benefits of procedure explained, the patient was taken to the fluoroscopy suite and placed in the prone position. Upon sequential sedation as per above, the Olympus duodenoscope [id number]   was inserted via the mouthpeice and carefully advanced to the second portion of the duodenum. The quality of visualization was good. The duodenoscope was withdrawn into a short position. Findings:   Esophagus: indirect visualization  Stomach: indirect visualization  Duodenum/jejunum: normal  Ampulla: periampullary diverticulum and ampulla was small and within the diverticulum. No stone was noted at the ampulla. Cholangiogram: -Bile duct was selectively cannulated using Dreamwire. Contrast was injected. Bile duct was diffusely dilated starting from the biliary bifurcation till the ampulla. A band like stricture was noted immediately above the ampulla and was < 1 cm in length. No mass or irregularity was noted. No filing defects were seen. Intrahepatic ducts appeared markedly thin and ectatic. Balloon sweep did not yield any stone. Contrast was not draining well from the common bile duct.  Biliary sphincterotomy was performed using ERBE. Biliary brushings were obtained. A plastic biliary stent 10 Fr X 7 cm ([employer], [employer]) was placed successfully. Stent was in good position and bile as well as contrast was draining promptly. Pancreatogram:not performed    Specimen Removed: * No specimens in log *    Complications: None. EBL:  None. Interventions:    Pancreatic: none  Biliary: Bile duct was selectively cannulated using Dreamwire. Contrast was injected. Bile duct was diffusely dilated starting from the biliary bifurcation till the ampulla. A band like stricture was noted immediately above the ampulla and was < 1 cm in length. No mass or irregularity was noted. No filing defects were seen. Intrahepatic ducts appeared markedly thin and ectatic. Balloon sweep did not yield any stone. Contrast was not draining well from the common bile duct. Biliary sphincterotomy was performed using ERBE. Biliary brushings were obtained. A plastic biliary stent 10 Fr X 7 cm ([employer], [employer]) was placed successfully. Stent was in good position and bile as well as contrast was draining promptly. Impression:  -Bile duct stricture- sampled and stented  -Ampullary diverticulum    Recommendations:    Clear liquid diet and advance as tolerated. Resume normal medication(s).   NO aspirin for 7 days    Await cytology  Watch for complications  ERCP in 3 months for stent removal/change  Follow up in office in 6-8 weeks      Discharge Disposition:  Home following recovery in [facility]8 Jade Fitch MD  10/30/2019  2:18 PM

## 2020-01-31 ENCOUNTER — ANESTHESIA (OUTPATIENT)
Dept: ENDOSCOPY | Age: 70
End: 2020-01-31
Payer: MEDICARE

## 2020-01-31 ENCOUNTER — APPOINTMENT (OUTPATIENT)
Dept: GENERAL RADIOLOGY | Age: 70
End: 2020-01-31
Attending: INTERNAL MEDICINE
Payer: MEDICARE

## 2020-01-31 ENCOUNTER — ANESTHESIA EVENT (OUTPATIENT)
Dept: ENDOSCOPY | Age: 70
End: 2020-01-31
Payer: MEDICARE

## 2020-01-31 ENCOUNTER — HOSPITAL ENCOUNTER (OUTPATIENT)
Age: 70
Setting detail: OUTPATIENT SURGERY
Discharge: HOME | End: 2020-01-31
Attending: INTERNAL MEDICINE | Admitting: INTERNAL MEDICINE
Payer: MEDICARE

## 2020-01-31 VITALS
BODY MASS INDEX: 39.33 KG/M2 | OXYGEN SATURATION: 93 % | WEIGHT: 251.1 LBS | SYSTOLIC BLOOD PRESSURE: 115 MMHG | RESPIRATION RATE: 18 BRPM | TEMPERATURE: 97.5 F | DIASTOLIC BLOOD PRESSURE: 57 MMHG | HEART RATE: 76 BPM

## 2020-01-31 PROCEDURE — 76040000008: Performed by: INTERNAL MEDICINE

## 2020-01-31 PROCEDURE — 77030007288 HC DEV LOK BILI BSC -A: Performed by: INTERNAL MEDICINE

## 2020-01-31 PROCEDURE — 77030013992 HC SNR POLYP ENDOSC BSC -B: Performed by: INTERNAL MEDICINE

## 2020-01-31 PROCEDURE — 77030008684 HC TU ET CUF COVD -B: Performed by: ANESTHESIOLOGY

## 2020-01-31 PROCEDURE — 74011636320 HC RX REV CODE- 636/320: Performed by: INTERNAL MEDICINE

## 2020-01-31 PROCEDURE — 74011250636 HC RX REV CODE- 250/636: Performed by: NURSE ANESTHETIST, CERTIFIED REGISTERED

## 2020-01-31 PROCEDURE — 77030009038 HC CATH BILI STN RTVR BSC -C: Performed by: INTERNAL MEDICINE

## 2020-01-31 PROCEDURE — 77030040361 HC SLV COMPR DVT MDII -B: Performed by: INTERNAL MEDICINE

## 2020-01-31 PROCEDURE — 77030026438 HC STYL ET INTUB CARD -A: Performed by: ANESTHESIOLOGY

## 2020-01-31 PROCEDURE — 74011000250 HC RX REV CODE- 250: Performed by: NURSE ANESTHETIST, CERTIFIED REGISTERED

## 2020-01-31 PROCEDURE — 77030012596 HC SPHNTOM BILI BSC -E: Performed by: INTERNAL MEDICINE

## 2020-01-31 PROCEDURE — 76060000033 HC ANESTHESIA 1 TO 1.5 HR: Performed by: INTERNAL MEDICINE

## 2020-01-31 RX ORDER — NALOXONE HYDROCHLORIDE 0.4 MG/ML
0.4 INJECTION, SOLUTION INTRAMUSCULAR; INTRAVENOUS; SUBCUTANEOUS
Status: ACTIVE | OUTPATIENT
Start: 2020-01-31 | End: 2020-01-31

## 2020-01-31 RX ORDER — ATROPINE SULFATE 0.1 MG/ML
0.5 INJECTION INTRAVENOUS
Status: DISCONTINUED | OUTPATIENT
Start: 2020-01-31 | End: 2020-01-31 | Stop reason: HOSPADM

## 2020-01-31 RX ORDER — SODIUM CHLORIDE 0.9 % (FLUSH) 0.9 %
5-40 SYRINGE (ML) INJECTION EVERY 8 HOURS
Status: DISCONTINUED | OUTPATIENT
Start: 2020-01-31 | End: 2020-01-31 | Stop reason: HOSPADM

## 2020-01-31 RX ORDER — NEOSTIGMINE METHYLSULFATE 1 MG/ML
INJECTION INTRAVENOUS AS NEEDED
Status: DISCONTINUED | OUTPATIENT
Start: 2020-01-31 | End: 2020-01-31 | Stop reason: HOSPADM

## 2020-01-31 RX ORDER — SUCCINYLCHOLINE CHLORIDE 20 MG/ML
INJECTION INTRAMUSCULAR; INTRAVENOUS AS NEEDED
Status: DISCONTINUED | OUTPATIENT
Start: 2020-01-31 | End: 2020-01-31 | Stop reason: HOSPADM

## 2020-01-31 RX ORDER — ROCURONIUM BROMIDE 10 MG/ML
INJECTION, SOLUTION INTRAVENOUS AS NEEDED
Status: DISCONTINUED | OUTPATIENT
Start: 2020-01-31 | End: 2020-01-31 | Stop reason: HOSPADM

## 2020-01-31 RX ORDER — FLUMAZENIL 0.1 MG/ML
0.2 INJECTION INTRAVENOUS
Status: ACTIVE | OUTPATIENT
Start: 2020-01-31 | End: 2020-01-31

## 2020-01-31 RX ORDER — BISMUTH SUBSALICYLATE 262 MG
1 TABLET,CHEWABLE ORAL DAILY
COMMUNITY

## 2020-01-31 RX ORDER — FENTANYL CITRATE 50 UG/ML
INJECTION, SOLUTION INTRAMUSCULAR; INTRAVENOUS AS NEEDED
Status: DISCONTINUED | OUTPATIENT
Start: 2020-01-31 | End: 2020-01-31 | Stop reason: HOSPADM

## 2020-01-31 RX ORDER — EPINEPHRINE 0.1 MG/ML
1 INJECTION INTRACARDIAC; INTRAVENOUS
Status: DISCONTINUED | OUTPATIENT
Start: 2020-01-31 | End: 2020-01-31 | Stop reason: HOSPADM

## 2020-01-31 RX ORDER — PHENYLEPHRINE HCL IN 0.9% NACL 0.4MG/10ML
SYRINGE (ML) INTRAVENOUS AS NEEDED
Status: DISCONTINUED | OUTPATIENT
Start: 2020-01-31 | End: 2020-01-31 | Stop reason: HOSPADM

## 2020-01-31 RX ORDER — SODIUM CHLORIDE 9 MG/ML
INJECTION, SOLUTION INTRAVENOUS
Status: DISCONTINUED | OUTPATIENT
Start: 2020-01-31 | End: 2020-01-31 | Stop reason: HOSPADM

## 2020-01-31 RX ORDER — DEXAMETHASONE SODIUM PHOSPHATE 4 MG/ML
INJECTION, SOLUTION INTRA-ARTICULAR; INTRALESIONAL; INTRAMUSCULAR; INTRAVENOUS; SOFT TISSUE AS NEEDED
Status: DISCONTINUED | OUTPATIENT
Start: 2020-01-31 | End: 2020-01-31 | Stop reason: HOSPADM

## 2020-01-31 RX ORDER — ONDANSETRON 2 MG/ML
INJECTION INTRAMUSCULAR; INTRAVENOUS AS NEEDED
Status: DISCONTINUED | OUTPATIENT
Start: 2020-01-31 | End: 2020-01-31 | Stop reason: HOSPADM

## 2020-01-31 RX ORDER — GLYCOPYRROLATE 0.2 MG/ML
INJECTION INTRAMUSCULAR; INTRAVENOUS AS NEEDED
Status: DISCONTINUED | OUTPATIENT
Start: 2020-01-31 | End: 2020-01-31 | Stop reason: HOSPADM

## 2020-01-31 RX ORDER — LIDOCAINE HYDROCHLORIDE 20 MG/ML
INJECTION, SOLUTION EPIDURAL; INFILTRATION; INTRACAUDAL; PERINEURAL AS NEEDED
Status: DISCONTINUED | OUTPATIENT
Start: 2020-01-31 | End: 2020-01-31 | Stop reason: HOSPADM

## 2020-01-31 RX ORDER — DEXTROMETHORPHAN/PSEUDOEPHED 2.5-7.5/.8
1.2 DROPS ORAL
Status: DISCONTINUED | OUTPATIENT
Start: 2020-01-31 | End: 2020-01-31 | Stop reason: HOSPADM

## 2020-01-31 RX ORDER — SODIUM CHLORIDE 0.9 % (FLUSH) 0.9 %
5-40 SYRINGE (ML) INJECTION AS NEEDED
Status: DISCONTINUED | OUTPATIENT
Start: 2020-01-31 | End: 2020-01-31 | Stop reason: HOSPADM

## 2020-01-31 RX ORDER — PROPOFOL 10 MG/ML
INJECTION, EMULSION INTRAVENOUS AS NEEDED
Status: DISCONTINUED | OUTPATIENT
Start: 2020-01-31 | End: 2020-01-31 | Stop reason: HOSPADM

## 2020-01-31 RX ORDER — FENTANYL CITRATE 50 UG/ML
INJECTION, SOLUTION INTRAMUSCULAR; INTRAVENOUS
Status: COMPLETED
Start: 2020-01-31 | End: 2020-01-31

## 2020-01-31 RX ADMIN — IOPAMIDOL 15 ML: 612 INJECTION, SOLUTION INTRAVENOUS at 08:33

## 2020-01-31 RX ADMIN — PROPOFOL 200 MG: 10 INJECTION, EMULSION INTRAVENOUS at 10:51

## 2020-01-31 RX ADMIN — PHENYLEPHRINE HYDROCHLORIDE 80 MCG: 10 INJECTION INTRAVENOUS at 11:27

## 2020-01-31 RX ADMIN — NEOSTIGMINE METHYLSULFATE 3 MG: 1 INJECTION, SOLUTION INTRAVENOUS at 11:22

## 2020-01-31 RX ADMIN — LIDOCAINE HYDROCHLORIDE 60 MG: 20 INJECTION, SOLUTION EPIDURAL; INFILTRATION; INTRACAUDAL; PERINEURAL at 10:51

## 2020-01-31 RX ADMIN — SUCCINYLCHOLINE CHLORIDE 160 MG: 20 INJECTION, SOLUTION INTRAMUSCULAR; INTRAVENOUS at 10:51

## 2020-01-31 RX ADMIN — FENTANYL CITRATE 50 MCG: 50 INJECTION, SOLUTION INTRAMUSCULAR; INTRAVENOUS at 10:51

## 2020-01-31 RX ADMIN — SODIUM CHLORIDE: 900 INJECTION, SOLUTION INTRAVENOUS at 10:43

## 2020-01-31 RX ADMIN — FENTANYL CITRATE 50 MCG: 50 INJECTION, SOLUTION INTRAMUSCULAR; INTRAVENOUS at 11:11

## 2020-01-31 RX ADMIN — DEXAMETHASONE SODIUM PHOSPHATE 4 MG: 4 INJECTION, SOLUTION INTRAMUSCULAR; INTRAVENOUS at 11:29

## 2020-01-31 RX ADMIN — ROCURONIUM BROMIDE 10 MG: 10 SOLUTION INTRAVENOUS at 10:51

## 2020-01-31 RX ADMIN — ONDANSETRON HYDROCHLORIDE 4 MG: 2 INJECTION, SOLUTION INTRAMUSCULAR; INTRAVENOUS at 11:29

## 2020-01-31 RX ADMIN — ROCURONIUM BROMIDE 25 MG: 10 SOLUTION INTRAVENOUS at 10:54

## 2020-01-31 RX ADMIN — GLYCOPYRROLATE 0.4 MG: 0.2 INJECTION, SOLUTION INTRAMUSCULAR; INTRAVENOUS at 11:22

## 2020-01-31 NOTE — PROGRESS NOTES
Margarita Reaves  1950  [id number]    Situation:  Verbal report received from: maddy jaimes rn  Procedure: Procedure(s):  ENDOSCOPIC RETROGRADE CHOLANGIOPANCREATOGRAPHY (ERCP)  SPHINCTEROTOMY  ENDOSCOPIC STONE EXTRACTION/BALLOON SWEEP  ENDOSCOPY WITH PROSTHESIS OR STENT REMOVAL    Background:    Preoperative diagnosis: RETAINED BILE DUCT STONE  Postoperative diagnosis: 1. - -Bile Duct Stones removed  2. - Stent Removal  3. - Sphincterotomy    :  Dr. Alta Vaughan  Assistant(s): Endoscopy Technician-1: Rand Vizcarra  Endoscopy RN-1: Ana Stewart RN    Specimens: * No specimens in log *  H. Pylori  no    Assessment:  Intra-procedure medications     Anesthesia gave intra-procedure sedation and medications, see anesthesia flow sheet yes    Intravenous fluids: NS@ KVO     Vital signs stable  yes    Abdominal assessment: round and soft  yes    Recommendation:  Discharge patient per MD order yes.   Family or Friend  yes  Permission to share finding with family or friend yes

## 2020-01-31 NOTE — DISCHARGE INSTRUCTIONS
[address] [facility] [address]  [address] [facility]  [phone]                     DISCHARGE INSTRUCTIONS    Rick Mckeon  [id number]  1950    DISCOMFORT:  Sore throat- throat lozenges or warm salt water gargle  redness at IV site- apply warm compress to area; if redness or soreness persist- contact your physician  Gaseous discomfort- walking, belching will help relieve any discomfort    DIET  You may eat and drink after you leave. You may resume your regular diet - however -  remember your colon is empty and a heavy meal will produce gas. Avoid these foods:  vegetables, fried / greasy foods, carbonated drinks    ACTIVITY  You may resume your normal daily activities   Spend the remainder of the day resting -  avoid any strenuous activity. You may not operate a vehicle for 12 hours  You may not engage in an occupation involving machinery or appliances for rest of today  You may not drink alcoholic beverages for at least 12 hours  Avoid making any critical decisions for at least 24 hour    CALL M.D. ANY SIGN OF   Increasing pain, nausea, vomiting  Abdominal distension (swelling)  New increased bleeding (oral or rectal)  Fever (chills)  Pain in chest area  Bloody discharge from nose or mouth  Shortness of breath    Follow-up Instructions:   Call Dr. Raghu Leon for any questions or problems.      . Telephone # [phone]       ENDOSCOPY FINDINGS:  1. - -Bile Duct Stones removed  2. - Stent Removal  3. - Sphincterotomy

## 2020-01-31 NOTE — PROCEDURES
[address]  217 [facility] [address] [place] [address], [address]  [phone]                   ERCP NOTE    NAME:  Ernestina Lepe   :   1950   MRN:   [id number]     Date/Time:  2020 11:28 AM    Procedure Type:   ERCPwith biliary sphincterotomy, biliary stone removal, biliary stent removal     Indications: biliary obstruction  Pre-operative Diagnosis: see indication above  Post-operative Diagnosis:  See findings below  : Josh Chung MD    Staff: Endoscopy Technician-1: Jenn Case  Endoscopy RN-1: Fay Doty RN     Implants: none    Referring Provider:     Antonia Price DO    Sedation:  General anesthesia    Procedure Details:  After informed consent was obtained with all risks and benefits of procedure explained, the patient was taken to the fluoroscopy suite and placed in the prone position. Upon sequential sedation as per above, the Olympus duodenoscope [id number]   was inserted via the mouthpeice and carefully advanced to the second portion of the duodenum. The quality of visualization was good. The duodenoscope was withdrawn into a short position. Findings:   Esophagus: indirect visualization  Stomach: indirect visualization  Duodenum/jejunum: normal  Ampulla:protruding stent  Cholangiogram: One plastic bile duct stent was removed using a snare. Bile duct was then selectively cannulated using a Dreamwire. Contrast was injected. Few irregular filing defects 4-7 mm in size were noted in distal common bile duct. Rest of the bile duct was otherwise normal. Previous stricture proximal to the ampulla appears to have resolved. Biliary sphincterotomy was extended using ERBE. Balloon sweeps with extraction balloon 9-12 mm injection below yielded irregular stone and sludge. Bile was draining freely and contrast drained promptly. No stones remained.  No stent was hence replaced  Pancreatogram:not performed    Specimen Removed: * No specimens in log *    Complications: None. EBL:  None. Interventions:    Pancreatic: none  Biliary: One plastic bile duct stent was removed using a snare. Bile duct was then selectively cannulated using a Dreamwire. Contrast was injected. Few irregular filing defects 4-7 mm in size were noted in distal common bile duct. Rest of the bile duct was otherwise normal. Previous stricture proximal to the ampulla appears to have resolved. Biliary sphincterotomy was extended using ERBE. Balloon sweeps with extraction balloon 9-12 mm injection below yielded irregular stone and sludge. Bile was draining freely and contrast drained promptly. No stones remained. No stent was hence replaced    Impression:    -Bile duct stones- extracted   -Bile duct stricture- resolved and stent removed    Recommendations:    Clear liquid diet and advance as tolerated. Low fat diet. Resume normal medication(s).   NO aspirin for 7 days    Watch for complications      Discharge Disposition:  Home following recovery in Endoscopy    Jarad Mott MD  1/31/2020  11:28 AM

## 2020-01-31 NOTE — H&P
[address]  [address]  [place], [address]  [phone]                                History and Physical     NAME: Maikel Jimenez   :  1950   MRN:  [id number]     HPI:  The patient was seen and examined. Past Surgical History:   Procedure Laterality Date    COLONOSCOPY N/A 2018    COLONOSCOPY performed by Carlos Farah MD at [facility] ENDOSCOPY    HX APPENDECTOMY      HX CERVICAL FUSION  2018    C2-4    HX CHOLECYSTECTOMY      HX HIP REPLACEMENT Right 2016?     HX LUMBAR FUSION      HX TONSIL AND ADENOIDECTOMY      [place] COLON CA SCRN NOT  [address] IND  2018         UPPER GI ENDOSCOPY,BIOPSY  2018          Past Medical History:   Diagnosis Date    Adverse effect of anesthesia     hard to wake up    Chronic kidney disease     Depression     GERD (gastroesophageal reflux disease)     Hypercholesterolemia     Hypertension     Thoracic disc herniation      Social History     Tobacco Use    Smoking status: Never Smoker    Smokeless tobacco: Never Used   Substance Use Topics    Alcohol use: No     Comment: rarely    Drug use: No     Allergies   Allergen Reactions    Penicillins Rash    Soap Rash     Family History   Problem Relation Age of Onset    No Known Problems Mother     Heart Attack Father     Heart Disease Maternal Aunt     Heart Attack Paternal Uncle     Cancer Maternal Aunt     Heart Disease Paternal Aunt      Current Facility-Administered Medications   Medication Dose Route Frequency    sodium chloride (NS) flush 5-40 mL  5-40 mL IntraVENous Q8H    sodium chloride (NS) flush 5-40 mL  5-40 mL IntraVENous PRN    naloxone (NARCAN) injection 0.4 mg  0.4 mg IntraVENous Multiple    flumazeniL (ROMAZICON) 0.1 mg/mL injection 0.2 mg  0.2 mg IntraVENous Multiple    simethicone (MYLICON) 60LD/7.2TU oral drops 80 mg  1.2 mL Oral Multiple    atropine injection 0.5 mg  0.5 mg IntraVENous ONCE PRN    EPINEPHrine (ADRENALIN) 0.1 mg/mL syringe 1 mg  1 mg Endoscopically ONCE PRN    glucagon (GLUCAGEN) injection 1 mg  1 mg IntraVENous ONCE PRN    iopamidoL (ISOVUE 300) 61 % contrast injection 50 mL  50 mL Other ONCE         PHYSICAL EXAM:  General: WD, WN. Alert, cooperative, no acute distress    HEENT: NC, Atraumatic. PERRLA, EOMI. Anicteric sclerae. Lungs:  CTA Bilaterally. No Wheezing/Rhonchi/Rales. Heart:  Regular  rhythm,  No murmur, No Rubs, No Gallops  Abdomen: Soft, Non distended, Non tender.  +Bowel sounds, no HSM  Extremities: No c/c/e  Neurologic:  CN 2-12 gi, Alert and oriented X 3. No acute neurological distress   Psych:   Good insight. Not anxious nor agitated. The heart, lungs and mental status were satisfactory for the administration of GA and for the procedure.       Mallampati score: 3       Assessment:   · Bile duct stricture    Plan:   · Endoscopic procedure  · GA

## 2020-01-31 NOTE — ANESTHESIA PREPROCEDURE EVALUATION
Relevant Problems   No relevant active problems       Anesthetic History   No history of anesthetic complications            Review of Systems / Medical History  Patient summary reviewed, nursing notes reviewed and pertinent labs reviewed    Pulmonary  Within defined limits                 Neuro/Psych   Within defined limits           Cardiovascular  Within defined limits  Hypertension                   GI/Hepatic/Renal  Within defined limits   GERD           Endo/Other  Within defined limits           Other Findings              Physical Exam    Airway  Mallampati: II  TM Distance: > 6 cm  Neck ROM: normal range of motion   Mouth opening: Normal     Cardiovascular  Regular rate and rhythm,  S1 and S2 normal,  no murmur, click, rub, or gallop             Dental  No notable dental hx       Pulmonary  Breath sounds clear to auscultation               Abdominal  GI exam deferred       Other Findings            Anesthetic Plan    ASA: 2  Anesthesia type: general          Induction: Intravenous  Anesthetic plan and risks discussed with: Patient

## 2020-03-02 ENCOUNTER — HOSPITAL ENCOUNTER (OUTPATIENT)
Dept: CT IMAGING | Age: 70
Discharge: HOME | End: 2020-03-02
Attending: INTERNAL MEDICINE
Payer: MEDICARE

## 2020-03-02 DIAGNOSIS — K83.1 STRICTURE OF BILE DUCT: ICD-10-CM

## 2020-03-02 LAB — CREAT BLD-MCNC: 1.1 MG/DL (ref 0.6–1.3)

## 2020-03-02 PROCEDURE — 82565 ASSAY OF CREATININE: CPT

## 2020-03-02 PROCEDURE — 74011636320 HC RX REV CODE- 636/320: Performed by: RADIOLOGY

## 2020-03-02 PROCEDURE — 74011000258 HC RX REV CODE- 258: Performed by: RADIOLOGY

## 2020-03-02 PROCEDURE — 74170 CT ABD WO CNTRST FLWD CNTRST: CPT

## 2020-03-02 RX ORDER — SODIUM CHLORIDE 0.9 % (FLUSH) 0.9 %
10 SYRINGE (ML) INJECTION
Status: COMPLETED | OUTPATIENT
Start: 2020-03-02 | End: 2020-03-02

## 2020-03-02 RX ADMIN — SODIUM CHLORIDE 100 ML: 900 INJECTION, SOLUTION INTRAVENOUS at 14:38

## 2020-03-02 RX ADMIN — Medication 10 ML: at 14:38

## 2020-03-02 RX ADMIN — IOPAMIDOL 100 ML: 755 INJECTION, SOLUTION INTRAVENOUS at 14:38

## 2022-02-24 NOTE — ANESTHESIA POSTPROCEDURE EVALUATION
Procedure(s):  ENDOSCOPIC RETROGRADE CHOLANGIOPANCREATOGRAPHY (ERCP)  SPHINCTEROTOMY  ENDOSCOPIC STONE EXTRACTION/BALLOON SWEEP  ENDOSCOPY WITH PROSTHESIS OR STENT REMOVAL. general    <BSHSIANPOST>    Vitals Value Taken Time   BP 87/56 1/31/2020 12:27 PM   Temp 36.4 °C (97.5 °F) 1/31/2020 11:53 AM   Pulse 69 1/31/2020 12:28 PM   Resp 0 1/31/2020 12:28 PM   SpO2 96 % 1/31/2020 12:28 PM   Vitals shown include unvalidated device data.
Never smoker

## 2022-03-18 PROBLEM — H04.123 DRY EYES, BILATERAL: Status: ACTIVE | Noted: 2017-07-21

## 2022-03-18 PROBLEM — I10 ESSENTIAL HYPERTENSION: Status: ACTIVE | Noted: 2017-07-21

## 2022-03-19 PROBLEM — K21.9 GASTROESOPHAGEAL REFLUX DISEASE WITHOUT ESOPHAGITIS: Status: ACTIVE | Noted: 2017-07-21

## 2022-03-19 PROBLEM — M19.90 ARTHRITIS: Status: ACTIVE | Noted: 2017-07-21

## 2022-03-19 PROBLEM — E78.2 MIXED HYPERLIPIDEMIA: Status: ACTIVE | Noted: 2017-07-21

## 2022-03-19 PROBLEM — M48.02 CERVICAL STENOSIS OF SPINAL CANAL: Status: ACTIVE | Noted: 2018-01-16

## 2022-03-19 PROBLEM — M43.26 FUSION OF LUMBAR SPINE: Status: ACTIVE | Noted: 2017-07-21

## 2022-04-13 NOTE — ANESTHESIA POSTPROCEDURE EVALUATION
Post-Anesthesia Evaluation and Assessment    Patient: Rody Melgoza MRN: [id number]  SSN: [id number]    YOB: 1950  Age: 69 y.o. Sex: female       Cardiovascular Function/Vital Signs  Visit Vitals    /81    Pulse 82    Temp 36.5 °C (97.7 °F)    Resp 16    Ht 5' 6\" (1.676 m)    Wt 108 kg (238 lb)    SpO2 93%    BMI 38.41 kg/m2       Patient is status post general anesthesia for Procedure(s):  ACF C5-7. Nausea/Vomiting: None    Postoperative hydration reviewed and adequate. Pain:  Pain Scale 1: Numeric (0 - 10) (01/16/18 2242)  Pain Intensity 1: 0 (01/16/18 2242)   Managed    Neurological Status:   Neuro (WDL): Exceptions to WDL (01/16/18 1825)  Neuro  Neurologic State: Drowsy (better now,) (01/16/18 1825)  LUE Motor Response: Purposeful (01/16/18 1825)  LLE Motor Response: Purposeful (01/16/18 1825)  RUE Motor Response: Purposeful (01/16/18 1825)  RLE Motor Response: Purposeful (01/16/18 1825)   At baseline    Mental Status and Level of Consciousness: Arousable    Pulmonary Status:   O2 Device: Nasal cannula (01/16/18 2000)   Adequate oxygenation and airway patent    Complications related to anesthesia: None    Post-anesthesia assessment completed.  No concerns    Signed By: Agapito Fitzpatrick MD     January 17, 2018 Morphology Per Location (Optional): Unchanged from prior photo Size Of Lesion: 1x1cm Detail Level: Detailed Size Of Lesion: 3-4mm Morphology Per Location (Optional): Whitish tone cystic papule Size Of Lesion: 4.5mm Morphology Per Location (Optional): Brown macule Body Location Override (Optional - Billing Will Still Be Based On Selected Body Map Location If Applicable): right inferior mons pubis Size Of Lesion: 2mm

## 2022-09-22 ENCOUNTER — OFFICE VISIT (OUTPATIENT)
Dept: ORTHOPEDIC SURGERY | Age: 72
End: 2022-09-22
Payer: MEDICARE

## 2022-09-22 VITALS — HEIGHT: 66 IN | BODY MASS INDEX: 38.57 KG/M2 | WEIGHT: 240 LBS

## 2022-09-22 DIAGNOSIS — M19.012 PRIMARY OSTEOARTHRITIS, LEFT SHOULDER: ICD-10-CM

## 2022-09-22 DIAGNOSIS — M19.019 OSTEOARTHRITIS OF SHOULDER, UNSPECIFIED LATERALITY, UNSPECIFIED OSTEOARTHRITIS TYPE: ICD-10-CM

## 2022-09-22 DIAGNOSIS — M25.512 ACUTE PAIN OF LEFT SHOULDER: Primary | ICD-10-CM

## 2022-09-22 PROCEDURE — G8756 NO BP MEASURE DOC: HCPCS | Performed by: ORTHOPAEDIC SURGERY

## 2022-09-22 PROCEDURE — G8536 NO DOC ELDER MAL SCRN: HCPCS | Performed by: ORTHOPAEDIC SURGERY

## 2022-09-22 PROCEDURE — G8432 DEP SCR NOT DOC, RNG: HCPCS | Performed by: ORTHOPAEDIC SURGERY

## 2022-09-22 PROCEDURE — G8417 CALC BMI ABV UP PARAM F/U: HCPCS | Performed by: ORTHOPAEDIC SURGERY

## 2022-09-22 PROCEDURE — G8427 DOCREV CUR MEDS BY ELIG CLIN: HCPCS | Performed by: ORTHOPAEDIC SURGERY

## 2022-09-22 PROCEDURE — 1090F PRES/ABSN URINE INCON ASSESS: CPT | Performed by: ORTHOPAEDIC SURGERY

## 2022-09-22 PROCEDURE — 1123F ACP DISCUSS/DSCN MKR DOCD: CPT | Performed by: ORTHOPAEDIC SURGERY

## 2022-09-22 PROCEDURE — 1101F PT FALLS ASSESS-DOCD LE1/YR: CPT | Performed by: ORTHOPAEDIC SURGERY

## 2022-09-22 PROCEDURE — 3017F COLORECTAL CA SCREEN DOC REV: CPT | Performed by: ORTHOPAEDIC SURGERY

## 2022-09-22 PROCEDURE — G8400 PT W/DXA NO RESULTS DOC: HCPCS | Performed by: ORTHOPAEDIC SURGERY

## 2022-09-22 PROCEDURE — 99203 OFFICE O/P NEW LOW 30 MIN: CPT | Performed by: ORTHOPAEDIC SURGERY

## 2022-09-22 NOTE — PROGRESS NOTES
Zuly Richards (: 1950) is a 74 y.o. female, patient, here for evaluation of the following chief complaint(s):  Shoulder Pain (Patient here for left shoulder pain.)       HPI:    Patient presents the office today with a chief complaint of left shoulder pain. Patient describes discomfort that is mostly noted when she is in an extended position of the shoulder. She has had physical therapy and she is mostly here today at the request of her physical therapist.  She has had no real significant improvement with the help of physical therapy. Patient is also here today with her . Allergies   Allergen Reactions    Penicillins Rash    Soap Rash       Current Outpatient Medications   Medication Sig    multivitamin (ONE A DAY) tablet Take 1 Tab by mouth daily. busPIRone (BUSPAR) 10 mg tablet Take 10 mg by mouth three (3) times daily. azelastine (ASTEPRO) 0.15 % (205.5 mcg) as needed. dicyclomine (BENTYL) 20 mg tablet Take 20 mg by mouth as needed. divalproex DR (DEPAKOTE) 250 mg tablet Take 250 mg by mouth three (3) times daily. gabapentin (NEURONTIN) 300 mg capsule Take 300 mg by mouth three (3) times daily. lidocaine 1.8 % ptmd by Apply Externally route. LORazepam (ATIVAN) 2 mg tablet Take 2 mg by mouth as needed for Anxiety. fish oil-omega-3 fatty acids 300-500 mg cap Take  by mouth two (2) times a day. PARoxetine (PAXIL) 40 mg tablet Take 40 mg by mouth daily. simvastatin (ZOCOR) 40 mg tablet Take 40 mg by mouth nightly. tiZANidine (ZANAFLEX) 4 mg tablet Take 4 mg by mouth as needed. senna-docusate (PERICOLACE) 8.6-50 mg per tablet Take 1 Tab by mouth two (2) times a day. metoprolol tartrate (LOPRESSOR) 25 mg tablet Take 1 Tab by mouth two (2) times a day. No current facility-administered medications for this visit.        Past Medical History:   Diagnosis Date    Adverse effect of anesthesia     hard to wake up    Chronic kidney disease     Depression GERD (gastroesophageal reflux disease)     Hypercholesterolemia     Hypertension     Thoracic disc herniation         Past Surgical History:   Procedure Laterality Date    COLONOSCOPY N/A 4/27/2018    COLONOSCOPY performed by Aneudy Tracey MD at [facility] ENDOSCOPY    HX APPENDECTOMY      HX CERVICAL FUSION  01/2018    C2-4    HX CHOLECYSTECTOMY      HX HIP REPLACEMENT Right 2016? HX LUMBAR FUSION      HX TONSIL AND ADENOIDECTOMY      MA COLON CA SCRN NOT  [address] IND  4/27/2018         UPPER GI ENDOSCOPY,BIOPSY  4/27/2018            Family History   Problem Relation Age of Onset    No Known Problems Mother     Heart Attack Father     Heart Disease Maternal Aunt     Heart Attack Paternal Uncle     Cancer Maternal Aunt     Heart Disease Paternal Aunt         Social History     Socioeconomic History    Marital status:      Spouse name: Not on file    Number of children: Not on file    Years of education: Not on file    Highest education level: Not on file   Occupational History    Not on file   Tobacco Use    Smoking status: Never    Smokeless tobacco: Never   Substance and Sexual Activity    Alcohol use: No     Comment: rarely    Drug use: No    Sexual activity: Not Currently   Other Topics Concern    Not on file   Social History Narrative    Not on file     Social Determinants of Health     Financial Resource Strain: Not on file   Food Insecurity: Not on file   Transportation Needs: Not on file   Physical Activity: Not on file   Stress: Not on file   Social Connections: Not on file   Intimate Partner Violence: Not on file   Housing Stability: Not on file       Review of Systems   Musculoskeletal:         Left shoulder pain     Vitals:  Ht 5' 6\" (1.676 m)   Wt 240 lb (108.9 kg)   BMI 38.74 kg/m²    Body mass index is 38.74 kg/m². Ortho Exam     Patient is alert and oriented x3. Patient is in no acute distress. Patient ambulates with a wheelchair      Left shoulder: No atrophy is noted.   Patient has 120 degrees of forward elevation, 80 degrees lateral duction and 60 degrees of external rotation. Patient has crepitation throughout range of motion. She has normal strength with forward elevation, lateral duction external rotation. Neurovascular examination is intact. Right shoulder: Patient has near full range of motion. She does not have pain with manipulation of the shoulder. There is no crepitation. She has normal strength. Neurovascular examination is intact. XR Results (most recent):  Results from Appointment encounter on 09/22/22    XR SHOULDER LT AP/LAT MIN 2 V    Narrative  Three-view x-ray of the left shoulder reveals significant osteoarthritis of the left shoulder with bone-on-bone finding and osteophytic changes         ASSESSMENT/PLAN:    Patient presents the office today with end-stage osteoarthritis of the left shoulder. I suspect, this is the reason why she has not had significant improvement with physical therapy. I have written a note to the physical therapist.  I think it be reasonable to discontinue physical therapy. Her current level of pain is acceptable. Therefore, I would not recommend any aggressive treatment. She and her  are completely agreeable to this. If her pain were to worsen, happy to see her back and consider cortisone injection.         Ayan Rojas MD

## 2022-09-22 NOTE — LETTER
9/22/2022    Patient: Dorota Nina   YOB: 1950   Date of Visit: 9/22/2022     Darío Granado, [address] [place] [address]  Via Fax: [phone]    Dear Darío Granado DO,      Thank you for referring Ms. Dorota Nina to [address] [place] [address] for evaluation. My notes for this consultation are attached. If you have questions, please do not hesitate to call me. I look forward to following your patient along with you.       Sincerely,    Deanna Guillermo MD

## 2023-05-23 RX ORDER — DICYCLOMINE HCL 20 MG
20 TABLET ORAL PRN
COMMUNITY

## 2023-05-23 RX ORDER — SIMVASTATIN 40 MG
40 TABLET ORAL NIGHTLY
COMMUNITY

## 2023-05-23 RX ORDER — LORAZEPAM 2 MG/1
2 TABLET ORAL PRN
COMMUNITY

## 2023-05-23 RX ORDER — PAROXETINE HYDROCHLORIDE 40 MG/1
40 TABLET, FILM COATED ORAL DAILY
COMMUNITY

## 2023-05-23 RX ORDER — GABAPENTIN 300 MG/1
300 CAPSULE ORAL 3 TIMES DAILY
COMMUNITY

## 2023-05-23 RX ORDER — DIVALPROEX SODIUM 250 MG/1
250 TABLET, DELAYED RELEASE ORAL 3 TIMES DAILY
COMMUNITY

## 2023-05-23 RX ORDER — AMOXICILLIN 250 MG
1 CAPSULE ORAL 2 TIMES DAILY
COMMUNITY
Start: 2018-01-17

## 2023-05-23 RX ORDER — AZELASTINE HCL 205.5 UG/1
SPRAY NASAL PRN
COMMUNITY

## 2023-05-23 RX ORDER — TIZANIDINE 4 MG/1
4 TABLET ORAL PRN
COMMUNITY

## 2023-05-23 RX ORDER — BUSPIRONE HYDROCHLORIDE 10 MG/1
10 TABLET ORAL 3 TIMES DAILY
COMMUNITY

## (undated) DEVICE — SPONGE: SPECIALTY PEANUT XR 100/CS: Brand: MEDICAL ACTION INDUSTRIES

## (undated) DEVICE — DEVICE LCK BILI RAP EXCHG OLPS --

## (undated) DEVICE — SET ADMIN 16ML TBNG L100IN 2 Y INJ SITE IV PIGGY BK DISP

## (undated) DEVICE — 3M™ DURAPORE™ SURGICAL TAPE 1538-3, 3 INCH X 10 YARD (7,5CM X 9,1M), 4 ROLLS/BOX: Brand: 3M™ DURAPORE™

## (undated) DEVICE — SYR 10ML LUER LOK 1/5ML GRAD --

## (undated) DEVICE — SURGIFOAM SPNG SZ 100

## (undated) DEVICE — SYR 3ML LL TIP 1/10ML GRAD --

## (undated) DEVICE — CUFF ADULT 1 PC 1 VINYL DISP --

## (undated) DEVICE — INSULATED BLADE ELECTRODE: Brand: EDGE

## (undated) DEVICE — GAUZE SPONGES,12 PLY: Brand: CURITY

## (undated) DEVICE — DRAPE,LAPAROTOMY,T,PEDI,STERILE: Brand: MEDLINE

## (undated) DEVICE — NEONATAL-ADULT SPO2 SENSOR: Brand: NELLCOR

## (undated) DEVICE — 1200 GUARD II KIT W/5MM TUBE W/O VAC TUBE: Brand: GUARDIAN

## (undated) DEVICE — BLOCK BITE ENDOSCP AD 21 MM W/ DIL BLU LF DISP

## (undated) DEVICE — Device

## (undated) DEVICE — KENDALL SCD EXPRESS SLEEVES, KNEE LENGTH, MEDIUM: Brand: KENDALL SCD

## (undated) DEVICE — DRAIN SURG W7MMXL20CM SIL FULL PERF HUBLESS FLAT RADPQ STRP

## (undated) DEVICE — TOOL 14MH30 LEGEND 14CM 3MM: Brand: MIDAS REX ™

## (undated) DEVICE — SCREW EXT FIX L14MM FOR DISTRCTN

## (undated) DEVICE — SNARE ENDOSCP M L240CM W27MM SHTH DIA2.4MM CHN 2.8MM OVL

## (undated) DEVICE — SYR LR LCK 1ML GRAD NSAF 30ML --

## (undated) DEVICE — BONE WAX WHITE: Brand: BONE WAX WHITE

## (undated) DEVICE — DRILL BIT WITH STOP, 12MM

## (undated) DEVICE — MASTISOL ADHESIVE LIQ 2/3ML

## (undated) DEVICE — MEDI-VAC YANK SUCT HNDL W/TPRD BULBOUS TIP: Brand: CARDINAL HEALTH

## (undated) DEVICE — GARMENT,MEDLINE,DVT,INT,CALF,LG, GEN2: Brand: MEDLINE

## (undated) DEVICE — NEEDLE HYPO 18GA L1.5IN PNK S STL HUB POLYPR SHLD REG BVL

## (undated) DEVICE — NDL SPNE QNCKE 18GX3.5IN LF --

## (undated) DEVICE — LUB JELLY PETROLEUM 1 OZ -- CONVERT TO ITEM 357604

## (undated) DEVICE — TOWEL 4 PLY TISS 19X30 SUE WHT

## (undated) DEVICE — SYRINGE 50ML E/T

## (undated) DEVICE — BIPOLAR IRRIGATOR INTEGRATED TUBING AND BIPOLAR CORD SET, DISPOSABLE

## (undated) DEVICE — RETRIEVAL BALLOON CATHETER: Brand: EXTRACTOR™ PRO RX

## (undated) DEVICE — LAMINECTOMY RICHMOND-LF: Brand: MEDLINE INDUSTRIES, INC.

## (undated) DEVICE — SOLUTION IV 250ML 0.9% SOD CHL CLR INJ FLX BG CONT PRT CLSR

## (undated) DEVICE — SUTURE VCRL 2-0 L27IN ABSRB UD CP-2 L26MM 1/2 CIR REV CUT J869H

## (undated) DEVICE — SUTURE VCRL SZ 3-0 L27IN ABSRB UD L24MM PS-1 3/8 CIR PRIM J936H

## (undated) DEVICE — REM POLYHESIVE ADULT PATIENT RETURN ELECTRODE: Brand: VALLEYLAB

## (undated) DEVICE — TEMPORARY FIXATION SCREW

## (undated) DEVICE — STERILE POLYISOPRENE POWDER-FREE SURGICAL GLOVES WITH EMOLLIENT COATING: Brand: PROTEXIS

## (undated) DEVICE — KENDALL RADIOLUCENT FOAM MONITORING ELECTRODE RECTANGULAR SHAPE: Brand: KENDALL

## (undated) DEVICE — SOLUTION IV 1000ML 0.9% SOD CHL

## (undated) DEVICE — Z DISCONTINUED PER MEDLINE LINE GAS SAMPLING O2/CO2 LNG AD 13 FT NSL W/ TBNG FILTERLINE

## (undated) DEVICE — SPHINCTEROTOME: Brand: DREAMTOME™ RX 44

## (undated) DEVICE — SOLIDIFIER MEDC 1200ML -- CONVERT TO 356117

## (undated) DEVICE — WIREGUIDED CYTOLOGY BRUSH: Brand: RX CYTOLOGY BRUSH

## (undated) DEVICE — GOWN,SIRUS,NONRNF,SETINSLV,2XL,18/CS: Brand: MEDLINE

## (undated) DEVICE — CATH IV AUTOGRD BC GRN 18GA 30 -- INSYTE

## (undated) DEVICE — COVER,TABLE,HEAVY DUTY,60"X90",STRL: Brand: MEDLINE

## (undated) DEVICE — DEVON™ KNEE AND BODY STRAP 60" X 3" (1.5 M X 7.6 CM): Brand: DEVON

## (undated) DEVICE — 4-PORT MANIFOLD: Brand: NEPTUNE 2

## (undated) DEVICE — SOLUTION IRRIG 1000ML H2O STRL BLT

## (undated) DEVICE — PREP SKN PREVAIL 40ML APPL --

## (undated) DEVICE — CATH IV AUTOGRD BC PNK 20GA 25 -- INSYTE

## (undated) DEVICE — CONTAINER SPEC 20 ML LID NEUT BUFF FORMALIN 10 % POLYPR STS

## (undated) DEVICE — BAG SPEC BIOHZRD 10 X 10 IN --

## (undated) DEVICE — BASIN EMSIS 16OZ GRAPHITE PLAS KID SHP MOLD GRAD FOR ORAL

## (undated) DEVICE — INFECTION CONTROL KIT SYS

## (undated) DEVICE — FORCEPS BX L240CM JAW DIA2.8MM L CAP W/ NDL MIC MESH TOOTH